# Patient Record
Sex: FEMALE | Race: OTHER | NOT HISPANIC OR LATINO
[De-identification: names, ages, dates, MRNs, and addresses within clinical notes are randomized per-mention and may not be internally consistent; named-entity substitution may affect disease eponyms.]

---

## 2018-05-29 ENCOUNTER — TRANSCRIPTION ENCOUNTER (OUTPATIENT)
Age: 73
End: 2018-05-29

## 2018-10-02 ENCOUNTER — OUTPATIENT (OUTPATIENT)
Dept: OUTPATIENT SERVICES | Facility: HOSPITAL | Age: 73
LOS: 1 days | Discharge: HOME | End: 2018-10-02
Payer: COMMERCIAL

## 2018-10-09 PROCEDURE — 90791 PSYCH DIAGNOSTIC EVALUATION: CPT

## 2018-10-24 PROCEDURE — 90870 ELECTROCONVULSIVE THERAPY: CPT

## 2018-10-24 RX ORDER — MIDAZOLAM HYDROCHLORIDE 1 MG/ML
4 INJECTION, SOLUTION INTRAMUSCULAR; INTRAVENOUS ONCE
Qty: 0 | Refills: 0 | Status: DISCONTINUED | OUTPATIENT
Start: 2018-10-24 | End: 2018-10-24

## 2018-10-24 RX ADMIN — MIDAZOLAM HYDROCHLORIDE 4 MILLIGRAM(S): 1 INJECTION, SOLUTION INTRAMUSCULAR; INTRAVENOUS at 14:45

## 2018-10-25 DIAGNOSIS — F33.2 MAJOR DEPRESSIVE DISORDER, RECURRENT SEVERE WITHOUT PSYCHOTIC FEATURES: ICD-10-CM

## 2018-10-26 PROCEDURE — 90870 ELECTROCONVULSIVE THERAPY: CPT

## 2018-10-26 RX ORDER — MIDAZOLAM HYDROCHLORIDE 1 MG/ML
4 INJECTION, SOLUTION INTRAMUSCULAR; INTRAVENOUS ONCE
Qty: 0 | Refills: 0 | Status: DISCONTINUED | OUTPATIENT
Start: 2018-10-26 | End: 2018-10-26

## 2018-10-26 RX ADMIN — MIDAZOLAM HYDROCHLORIDE 4 MILLIGRAM(S): 1 INJECTION, SOLUTION INTRAMUSCULAR; INTRAVENOUS at 13:15

## 2018-10-31 PROCEDURE — 90870 ELECTROCONVULSIVE THERAPY: CPT

## 2018-10-31 RX ORDER — MIDAZOLAM HYDROCHLORIDE 1 MG/ML
2 INJECTION, SOLUTION INTRAMUSCULAR; INTRAVENOUS ONCE
Qty: 0 | Refills: 0 | Status: DISCONTINUED | OUTPATIENT
Start: 2018-10-31 | End: 2018-10-31

## 2018-10-31 RX ADMIN — MIDAZOLAM HYDROCHLORIDE 2 MILLIGRAM(S): 1 INJECTION, SOLUTION INTRAMUSCULAR; INTRAVENOUS at 08:04

## 2018-11-02 PROCEDURE — 90870 ELECTROCONVULSIVE THERAPY: CPT

## 2018-11-02 RX ORDER — MIDAZOLAM HYDROCHLORIDE 1 MG/ML
2 INJECTION, SOLUTION INTRAMUSCULAR; INTRAVENOUS ONCE
Qty: 0 | Refills: 0 | Status: DISCONTINUED | OUTPATIENT
Start: 2018-11-02 | End: 2018-11-02

## 2018-11-02 RX ADMIN — MIDAZOLAM HYDROCHLORIDE 2 MILLIGRAM(S): 1 INJECTION, SOLUTION INTRAMUSCULAR; INTRAVENOUS at 08:04

## 2018-11-05 PROCEDURE — 90870 ELECTROCONVULSIVE THERAPY: CPT

## 2018-11-05 RX ORDER — MIDAZOLAM HYDROCHLORIDE 1 MG/ML
2 INJECTION, SOLUTION INTRAMUSCULAR; INTRAVENOUS ONCE
Qty: 0 | Refills: 0 | Status: DISCONTINUED | OUTPATIENT
Start: 2018-11-05 | End: 2018-11-05

## 2018-11-05 RX ADMIN — MIDAZOLAM HYDROCHLORIDE 2 MILLIGRAM(S): 1 INJECTION, SOLUTION INTRAMUSCULAR; INTRAVENOUS at 12:35

## 2018-11-09 PROCEDURE — 90870 ELECTROCONVULSIVE THERAPY: CPT

## 2018-11-09 RX ORDER — MIDAZOLAM HYDROCHLORIDE 1 MG/ML
2 INJECTION, SOLUTION INTRAMUSCULAR; INTRAVENOUS ONCE
Qty: 0 | Refills: 0 | Status: DISCONTINUED | OUTPATIENT
Start: 2018-11-09 | End: 2018-11-09

## 2018-11-09 RX ADMIN — MIDAZOLAM HYDROCHLORIDE 2 MILLIGRAM(S): 1 INJECTION, SOLUTION INTRAMUSCULAR; INTRAVENOUS at 12:03

## 2018-11-13 PROCEDURE — 90870 ELECTROCONVULSIVE THERAPY: CPT

## 2018-11-13 RX ORDER — MIDAZOLAM HYDROCHLORIDE 1 MG/ML
2 INJECTION, SOLUTION INTRAMUSCULAR; INTRAVENOUS ONCE
Qty: 0 | Refills: 0 | Status: DISCONTINUED | OUTPATIENT
Start: 2018-11-13 | End: 2018-11-13

## 2018-11-13 RX ADMIN — MIDAZOLAM HYDROCHLORIDE 2 MILLIGRAM(S): 1 INJECTION, SOLUTION INTRAMUSCULAR; INTRAVENOUS at 08:25

## 2018-11-19 PROCEDURE — 90870 ELECTROCONVULSIVE THERAPY: CPT

## 2018-11-19 RX ORDER — MIDAZOLAM HYDROCHLORIDE 1 MG/ML
2 INJECTION, SOLUTION INTRAMUSCULAR; INTRAVENOUS ONCE
Qty: 0 | Refills: 0 | Status: DISCONTINUED | OUTPATIENT
Start: 2018-11-19 | End: 2018-11-19

## 2018-11-19 RX ADMIN — MIDAZOLAM HYDROCHLORIDE 2 MILLIGRAM(S): 1 INJECTION, SOLUTION INTRAMUSCULAR; INTRAVENOUS at 08:21

## 2018-11-26 PROCEDURE — 90870 ELECTROCONVULSIVE THERAPY: CPT

## 2018-11-26 RX ORDER — MIDAZOLAM HYDROCHLORIDE 1 MG/ML
2 INJECTION, SOLUTION INTRAMUSCULAR; INTRAVENOUS ONCE
Qty: 0 | Refills: 0 | Status: DISCONTINUED | OUTPATIENT
Start: 2018-11-26 | End: 2018-11-26

## 2018-11-26 RX ADMIN — MIDAZOLAM HYDROCHLORIDE 2 MILLIGRAM(S): 1 INJECTION, SOLUTION INTRAMUSCULAR; INTRAVENOUS at 08:03

## 2018-12-03 PROCEDURE — 90870 ELECTROCONVULSIVE THERAPY: CPT

## 2018-12-03 RX ORDER — MIDAZOLAM HYDROCHLORIDE 1 MG/ML
2 INJECTION, SOLUTION INTRAMUSCULAR; INTRAVENOUS ONCE
Qty: 0 | Refills: 0 | Status: DISCONTINUED | OUTPATIENT
Start: 2018-12-03 | End: 2018-12-03

## 2018-12-03 RX ADMIN — MIDAZOLAM HYDROCHLORIDE 2 MILLIGRAM(S): 1 INJECTION, SOLUTION INTRAMUSCULAR; INTRAVENOUS at 08:00

## 2018-12-18 PROCEDURE — 90870 ELECTROCONVULSIVE THERAPY: CPT

## 2018-12-18 RX ORDER — MIDAZOLAM HYDROCHLORIDE 1 MG/ML
2 INJECTION, SOLUTION INTRAMUSCULAR; INTRAVENOUS ONCE
Qty: 0 | Refills: 0 | Status: DISCONTINUED | OUTPATIENT
Start: 2018-12-18 | End: 2018-12-18

## 2018-12-18 RX ADMIN — MIDAZOLAM HYDROCHLORIDE 2 MILLIGRAM(S): 1 INJECTION, SOLUTION INTRAMUSCULAR; INTRAVENOUS at 09:54

## 2019-01-28 PROCEDURE — 90870 ELECTROCONVULSIVE THERAPY: CPT

## 2019-01-28 RX ORDER — MIDAZOLAM HYDROCHLORIDE 1 MG/ML
2 INJECTION, SOLUTION INTRAMUSCULAR; INTRAVENOUS ONCE
Qty: 0 | Refills: 0 | Status: DISCONTINUED | OUTPATIENT
Start: 2019-01-28 | End: 2019-01-28

## 2019-01-28 RX ADMIN — MIDAZOLAM HYDROCHLORIDE 2 MILLIGRAM(S): 1 INJECTION, SOLUTION INTRAMUSCULAR; INTRAVENOUS at 08:55

## 2019-02-19 PROCEDURE — 90870 ELECTROCONVULSIVE THERAPY: CPT

## 2019-02-19 RX ORDER — MIDAZOLAM HYDROCHLORIDE 1 MG/ML
2 INJECTION, SOLUTION INTRAMUSCULAR; INTRAVENOUS ONCE
Qty: 0 | Refills: 0 | Status: DISCONTINUED | OUTPATIENT
Start: 2019-02-19 | End: 2019-02-19

## 2019-02-19 RX ADMIN — MIDAZOLAM HYDROCHLORIDE 2 MILLIGRAM(S): 1 INJECTION, SOLUTION INTRAMUSCULAR; INTRAVENOUS at 10:12

## 2019-02-25 PROCEDURE — 90870 ELECTROCONVULSIVE THERAPY: CPT

## 2019-02-25 RX ORDER — MIDAZOLAM HYDROCHLORIDE 1 MG/ML
2 INJECTION, SOLUTION INTRAMUSCULAR; INTRAVENOUS ONCE
Qty: 0 | Refills: 0 | Status: DISCONTINUED | OUTPATIENT
Start: 2019-02-25 | End: 2019-02-25

## 2019-02-25 RX ADMIN — MIDAZOLAM HYDROCHLORIDE 2 MILLIGRAM(S): 1 INJECTION, SOLUTION INTRAMUSCULAR; INTRAVENOUS at 09:24

## 2019-03-06 PROCEDURE — 90870 ELECTROCONVULSIVE THERAPY: CPT

## 2019-03-06 RX ORDER — MIDAZOLAM HYDROCHLORIDE 1 MG/ML
2 INJECTION, SOLUTION INTRAMUSCULAR; INTRAVENOUS ONCE
Qty: 0 | Refills: 0 | Status: DISCONTINUED | OUTPATIENT
Start: 2019-03-06 | End: 2019-03-06

## 2019-03-06 RX ADMIN — MIDAZOLAM HYDROCHLORIDE 2 MILLIGRAM(S): 1 INJECTION, SOLUTION INTRAMUSCULAR; INTRAVENOUS at 16:20

## 2019-03-12 RX ORDER — MIDAZOLAM HYDROCHLORIDE 1 MG/ML
2 INJECTION, SOLUTION INTRAMUSCULAR; INTRAVENOUS ONCE
Qty: 0 | Refills: 0 | Status: DISCONTINUED | OUTPATIENT
Start: 2019-03-12 | End: 2019-03-12

## 2019-03-12 RX ADMIN — MIDAZOLAM HYDROCHLORIDE 2 MILLIGRAM(S): 1 INJECTION, SOLUTION INTRAMUSCULAR; INTRAVENOUS at 09:15

## 2019-03-25 PROCEDURE — 90870 ELECTROCONVULSIVE THERAPY: CPT

## 2019-03-25 RX ORDER — MIDAZOLAM HYDROCHLORIDE 1 MG/ML
2 INJECTION, SOLUTION INTRAMUSCULAR; INTRAVENOUS ONCE
Qty: 0 | Refills: 0 | Status: DISCONTINUED | OUTPATIENT
Start: 2019-03-25 | End: 2019-03-25

## 2019-03-25 RX ADMIN — MIDAZOLAM HYDROCHLORIDE 2 MILLIGRAM(S): 1 INJECTION, SOLUTION INTRAMUSCULAR; INTRAVENOUS at 07:59

## 2019-04-08 PROCEDURE — 90870 ELECTROCONVULSIVE THERAPY: CPT

## 2019-04-08 RX ORDER — MIDAZOLAM HYDROCHLORIDE 1 MG/ML
2 INJECTION, SOLUTION INTRAMUSCULAR; INTRAVENOUS ONCE
Qty: 0 | Refills: 0 | Status: DISCONTINUED | OUTPATIENT
Start: 2019-04-08 | End: 2019-04-08

## 2019-04-08 RX ADMIN — MIDAZOLAM HYDROCHLORIDE 2 MILLIGRAM(S): 1 INJECTION, SOLUTION INTRAMUSCULAR; INTRAVENOUS at 08:02

## 2019-04-29 PROCEDURE — 90870 ELECTROCONVULSIVE THERAPY: CPT

## 2019-04-29 RX ORDER — MIDAZOLAM HYDROCHLORIDE 1 MG/ML
2 INJECTION, SOLUTION INTRAMUSCULAR; INTRAVENOUS ONCE
Qty: 0 | Refills: 0 | Status: DISCONTINUED | OUTPATIENT
Start: 2019-04-29 | End: 2019-04-29

## 2019-04-29 RX ADMIN — MIDAZOLAM HYDROCHLORIDE 2 MILLIGRAM(S): 1 INJECTION, SOLUTION INTRAMUSCULAR; INTRAVENOUS at 07:46

## 2019-05-06 ENCOUNTER — APPOINTMENT (OUTPATIENT)
Dept: NEUROLOGY | Facility: CLINIC | Age: 74
End: 2019-05-06

## 2019-05-21 PROCEDURE — 90870 ELECTROCONVULSIVE THERAPY: CPT

## 2019-05-21 RX ORDER — MIDAZOLAM HYDROCHLORIDE 1 MG/ML
2 INJECTION, SOLUTION INTRAMUSCULAR; INTRAVENOUS ONCE
Refills: 0 | Status: DISCONTINUED | OUTPATIENT
Start: 2019-05-21 | End: 2019-05-21

## 2019-05-21 RX ADMIN — MIDAZOLAM HYDROCHLORIDE 2 MILLIGRAM(S): 1 INJECTION, SOLUTION INTRAMUSCULAR; INTRAVENOUS at 08:32

## 2019-06-17 PROCEDURE — 90870 ELECTROCONVULSIVE THERAPY: CPT

## 2019-06-17 RX ORDER — MIDAZOLAM HYDROCHLORIDE 1 MG/ML
2 INJECTION, SOLUTION INTRAMUSCULAR; INTRAVENOUS ONCE
Refills: 0 | Status: DISCONTINUED | OUTPATIENT
Start: 2019-06-17 | End: 2019-06-17

## 2019-06-17 RX ADMIN — MIDAZOLAM HYDROCHLORIDE 2 MILLIGRAM(S): 1 INJECTION, SOLUTION INTRAMUSCULAR; INTRAVENOUS at 08:37

## 2021-05-11 ENCOUNTER — APPOINTMENT (OUTPATIENT)
Dept: DISASTER EMERGENCY | Facility: OTHER | Age: 76
End: 2021-05-11
Payer: MEDICARE

## 2021-05-11 PROCEDURE — 0012A: CPT

## 2023-12-30 ENCOUNTER — EMERGENCY (EMERGENCY)
Facility: HOSPITAL | Age: 78
LOS: 0 days | Discharge: HOME | End: 2023-12-30
Attending: STUDENT IN AN ORGANIZED HEALTH CARE EDUCATION/TRAINING PROGRAM
Payer: MEDICARE

## 2023-12-30 VITALS
HEART RATE: 76 BPM | DIASTOLIC BLOOD PRESSURE: 79 MMHG | HEIGHT: 60 IN | TEMPERATURE: 98 F | SYSTOLIC BLOOD PRESSURE: 156 MMHG | OXYGEN SATURATION: 94 % | WEIGHT: 130.07 LBS | RESPIRATION RATE: 20 BRPM

## 2023-12-30 VITALS
SYSTOLIC BLOOD PRESSURE: 148 MMHG | TEMPERATURE: 99 F | DIASTOLIC BLOOD PRESSURE: 77 MMHG | OXYGEN SATURATION: 97 % | HEART RATE: 92 BPM | RESPIRATION RATE: 18 BRPM

## 2023-12-30 DIAGNOSIS — F41.9 ANXIETY DISORDER, UNSPECIFIED: ICD-10-CM

## 2023-12-30 DIAGNOSIS — F03.90 UNSPECIFIED DEMENTIA WITHOUT BEHAVIORAL DISTURBANCE: ICD-10-CM

## 2023-12-30 DIAGNOSIS — R09.89 OTHER SPECIFIED SYMPTOMS AND SIGNS INVOLVING THE CIRCULATORY AND RESPIRATORY SYSTEMS: ICD-10-CM

## 2023-12-30 DIAGNOSIS — I10 ESSENTIAL (PRIMARY) HYPERTENSION: ICD-10-CM

## 2023-12-30 PROCEDURE — 71046 X-RAY EXAM CHEST 2 VIEWS: CPT | Mod: 26

## 2023-12-30 PROCEDURE — 99284 EMERGENCY DEPT VISIT MOD MDM: CPT

## 2023-12-30 NOTE — ED ADULT NURSE NOTE - OBJECTIVE STATEMENT
as per granddaughter, pt increased lethargy and back pain x 2 days, pt noticed pt choking, lips turning breathing, ems called, patient improved prior to ems arrival. patient appear comfortable. denies respiratory distress, denies throat pain. pt c/o back pain. pt also fell out of chair today. patient energetic at baseline.  Darci # [id number]. as per granddaughter, pt increased lethargy and back pain x 2 days, pt noticed pt choking, lips turning breathing, ems called, patient improved prior to ems arrival. patient appear comfortable. denies respiratory distress, denies throat pain. pt c/o back pain. pt also fell out of chair today. patient energetic at baseline.  Darci # [id number].

## 2023-12-30 NOTE — ED PROVIDER NOTE - NSFOLLOWUPINSTRUCTIONS_ED_ALL_ED_FT
followup with primary care doctor in next 7 days.    return if symptoms worsen, difficulty swallowing, throat pain, chest pain, shortness of breath.

## 2023-12-30 NOTE — ED PROVIDER NOTE - OBJECTIVE STATEMENT
79 y/o F  hx of dementia, anxiety, HTN presents for choking episode. daughter at bedside, states that granddaughter was home w/ patient when she had a few second episode where she was choking on salmon/rice and turned "blue" but quickly returned back to pink color. patient currently not endorsing any acute complaints, denies any throat pain/chest pain. Denies fever/chills. Denies nausea/vomiting. denies abdominal pain.

## 2023-12-30 NOTE — ED ADULT NURSE NOTE - NSFALLRISKINTERV_ED_ALL_ED
Assistance OOB with selected safe patient handling equipment if applicable/Assistance with ambulation/Communicate fall risk and risk factors to all staff, patient, and family/Monitor gait and stability/Monitor for mental status changes and reorient to person, place, and time, as needed/Provide visual cue: yellow wristband, yellow gown, etc/Reinforce activity limits and safety measures with patient and family/Toileting schedule using arm’s reach rule for commode and bathroom/Use of alarms - bed, stretcher, chair and/or video monitoring/Call bell, personal items and telephone in reach/Instruct patient to call for assistance before getting out of bed/chair/stretcher/Non-slip footwear applied when patient is off stretcher/[place] to call system/Physically safe environment - no spills, clutter or unnecessary equipment/Purposeful Proactive Rounding/Room/bathroom lighting operational, light cord in reach Assistance OOB with selected safe patient handling equipment if applicable/Assistance with ambulation/Communicate fall risk and risk factors to all staff, patient, and family/Monitor gait and stability/Monitor for mental status changes and reorient to person, place, and time, as needed/Provide visual cue: yellow wristband, yellow gown, etc/Reinforce activity limits and safety measures with patient and family/Toileting schedule using arm’s reach rule for commode and bathroom/Use of alarms - bed, stretcher, chair and/or video monitoring/Call bell, personal items and telephone in reach/Instruct patient to call for assistance before getting out of bed/chair/stretcher/Non-slip footwear applied when patient is off stretcher/[place] to call system/Physically safe environment - no spills, clutter or unnecessary equipment/Purposeful Proactive Rounding/Room/bathroom lighting operational, light cord in reach

## 2023-12-30 NOTE — ED PROVIDER NOTE - CLINICAL SUMMARY MEDICAL DECISION MAKING FREE TEXT BOX
77 y/o F  hx of dementia, anxiety, HTN presents for choking episode. daughter at bedside, states that granddaughter was home w/ patient when she had a few second episode where she was choking on salmon/rice and turned "blue" but quickly returned back to pink color. patient currently not endorsing any acute complaints, denies any throat pain/chest pain. Denies fever/chills. Denies nausea/vomiting. denies abdominal pain.   tolerated Po intake here in ER w/o any dysphagia or discomfort.   at baseline now w/ no acute complaints. cxr and will reassess. 79 y/o F  hx of dementia, anxiety, HTN presents for choking episode. daughter at bedside, states that granddaughter was home w/ patient when she had a few second episode where she was choking on salmon/rice and turned "blue" but quickly returned back to pink color. patient currently not endorsing any acute complaints, denies any throat pain/chest pain. Denies fever/chills. Denies nausea/vomiting. denies abdominal pain.   tolerated Po intake here in ER w/o any dysphagia or discomfort.   at baseline now w/ no acute complaints. cxr and will reassess.

## 2023-12-30 NOTE — ED PROVIDER NOTE - PROGRESS NOTE DETAILS
no foreign body appreciated on cxr, patient comfortable w/ no acute complaints, tolerated Po intake here.

## 2023-12-30 NOTE — ED ADULT NURSE NOTE - CHIEF COMPLAINT QUOTE
Choking on a piece of salmon turned blue H/O Dementia HTN Anxiety Romansh speaking granddaughter translating and witnessed episode at home Ina Olson pt pink unlaboured at triage Choking on a piece of salmon turned blue H/O Dementia HTN Anxiety Malay speaking granddaughter translating and witnessed episode at home Ina Olson pt pink unlaboured at triage

## 2023-12-30 NOTE — ED PROVIDER NOTE - NSICDXPASTMEDICALHX_GEN_ALL_CORE_FT
PAST MEDICAL HISTORY:  Depression     HLD (hyperlipidemia)     HTN (hypertension)      Colchicine Counseling:  Patient counseled regarding adverse effects including but not limited to stomach upset (nausea, vomiting, stomach pain, or diarrhea).  Patient instructed to limit alcohol consumption while taking this medication.  Colchicine may reduce blood counts especially with prolonged use.  The patient understands that monitoring of kidney function and blood counts may be required, especially at baseline. The patient verbalized understanding of the proper use and possible adverse effects of colchicine.  All of the patient's questions and concerns were addressed.

## 2023-12-30 NOTE — ED ADULT TRIAGE NOTE - CHIEF COMPLAINT QUOTE
Choking on a piece of salmon turned blue H/O Dementia HTN Anxiety Mohawk speaking granddaughter translating and witnessed episode at home Ina Olson pt pink unlaboured at triage Choking on a piece of salmon turned blue H/O Dementia HTN Anxiety Turkish speaking granddaughter translating and witnessed episode at home Ina Olson pt pink unlaboured at triage

## 2023-12-30 NOTE — ED PROVIDER NOTE - PATIENT PORTAL LINK FT
You can access the FollowMyHealth Patient Portal offered by [facility] by registering at the following website: [email or web address]. By joining [facility]’s FollowMyHealth portal, you will also be able to view your health information using other applications (apps) compatible with our system. You can access the FollowMyHealth Patient Portal offered by [facility] by registering at the following website: [email or web address]. By joining [facility]’s FollowMyHealth portal, you will also be able to view your health information using other applications (apps) compatible with our system.

## 2024-01-10 ENCOUNTER — APPOINTMENT (OUTPATIENT)
Dept: ORTHOPEDIC SURGERY | Facility: CLINIC | Age: 79
End: 2024-01-10
Payer: MEDICARE

## 2024-01-10 VITALS
HEART RATE: 105 BPM | BODY MASS INDEX: 31.27 KG/M2 | DIASTOLIC BLOOD PRESSURE: 89 MMHG | WEIGHT: 139 LBS | HEIGHT: 56 IN | SYSTOLIC BLOOD PRESSURE: 149 MMHG

## 2024-01-10 DIAGNOSIS — S22.009A UNSPECIFIED FRACTURE OF UNSPECIFIED THORACIC VERTEBRA, INITIAL ENCOUNTER FOR CLOSED FRACTURE: ICD-10-CM

## 2024-01-10 PROCEDURE — 99203 OFFICE O/P NEW LOW 30 MIN: CPT

## 2024-01-10 NOTE — HISTORY OF PRESENT ILLNESS
[de-identified] : Ms. DARCIE GRANT  is a 78 year old female who presents with complaints of thoracic back pain since [place] without any specific cause or trauma.  Denies any LE radicular symptoms.  Normal bowel and bladder control.   Denies any recent fevers, chills, sweats, weight loss, or infection.  She has been taking Tylenol and advil and over the past few days her pain is much better.  She is here to review her xrays.   The patients past medical history, past surgical history, medications, allergies, and social history were reviewed by me today with the patient and documented accordingly.  In addition, the patient's family history, which is noncontributory to their visit, was also reviewed.

## 2024-01-10 NOTE — PHYSICAL EXAM
[de-identified] : Examination of the thoracic and lumbar spine reveals no midline tenderness palpation, step-offs, or skin lesions. Decreased range of motion with respect to flexion, extension, lateral bending, and rotation. No tenderness to palpation of the sciatic notch. No tenderness palpation of the bilateral greater trochanters. No pain with passive internal/external rotation of the hips. No instability of bilateral lower extremities.  Negative NEMESIO. Negative straight leg raise bilaterally. No bowstring. Negative femoral stretch. 5 out of 5 iliopsoas, hip abductors, hips adductors, quadriceps, hamstrings, gastrocsoleus, tibialis anterior, extensor hallucis longus, peroneals. Grossly intact sensation to light touch bilateral lower extremities. 1+ patellar and Achilles reflexes. Downgoing Babinski. No clonus. Intact proprioception. Palpable pulses. No skin lesion and no edema on the right and left lower extremities. [de-identified] : Review of her imaging reveals some age-indeterminate thoracic compression deformities

## 2024-01-10 NOTE — DISCUSSION/SUMMARY
[de-identified] : Given the evidence of fracture on imaging I recommend a thoracic MRI.  Follow-up afterwards.

## 2024-01-29 ENCOUNTER — OUTPATIENT (OUTPATIENT)
Dept: OUTPATIENT SERVICES | Facility: HOSPITAL | Age: 79
LOS: 1 days | Discharge: HOME | End: 2024-01-29
Payer: MEDICARE

## 2024-02-05 PROCEDURE — 90792 PSYCH DIAG EVAL W/MED SRVCS: CPT

## 2024-02-05 NOTE — ECT CONSULT NOTE - NSECTREASONCONSCOMMENTS_PSY_ALL_CORE
Pt interviewed via Teams Solo program with 2 daughters in room per her request.   MD was in private home office in [place]

## 2024-02-05 NOTE — ECT CONSULT NOTE - DESCRIPTION
HTN, HLD, osteoporosis (recently dx with spinal compression fracture ?location and per daughters is not supposed to lift heavy objects)  NKDA  Dentita: Good

## 2024-02-05 NOTE — ECT CONSULT NOTE - OTHER PAST PSYCHIATRIC HISTORY (INCLUDE DETAILS REGARDING ONSET, COURSE OF ILLNESS, INPATIENT/OUTPATIENT TREATMENT)
77 yo Kenyan speaking female (daughters available to translate per pts request), domiciled in [place].  H/o MDD tx with ECT x2.   Last ECT was 6/19 (Bifrontal #20, tapered to monthly maintenance schedule).   Pt is poor historian saying she is anxious and asks daughters to help with info.   Of note, pt was not oriented to date but was to person, location and situation.    Per daughters, pt was doing well after last course of ECT up to around 1 month ago.   Started having back pain and was diagnosed with COMPRESSION FRACTURE OF SPINE.   Since then pt has been in mild pain, less active.  This progressed to lower motivation to be out, not wanting to go to social events at "[employer]" (which she usually enjoys).  Daughters note she is eating less (did not have breakfast or lunch today).  Less concentration, inc anxiety.  No SI.    Last course of ECT was all ambulatory, staring 10/24/18-6/17/24.  Daughters and pt states she did well with ECT.   Depression resolved and she has been symptom free till about 1 month ago.   No report of significant side effects from last course.      Was admitted to [facility] July-Aug 2016 for similar depression with anxiety. Was treated with medications and ECT (had 7 Bifrontal ECTs, last 8/8/16 at 40% energy) with quick resolution of depression. Patient does not remember being hospitalized or ECT. Daughters support there was some short-term memory issues around her treatment (no problems with retaining information now). This was patient's first episode of depression (which started about 2 yrs prior). Since patient was doing well after hospitalization, patient requested to stop ECT and meds. Was doing well until recently. No other hospitalizations. No history of dangerousness.    Has been in tx outpatient f/u with Dr. Sellers and compliant with medications.

## 2024-02-05 NOTE — ECT CONSULT NOTE - NSECTRECELECPLCOTHER_PSY_ALL_CORE
See comment above.  BF if cog stable as pt did well with this in 2018.  If cog poor, consider RUL tx approach.

## 2024-02-05 NOTE — ECT CONSULT NOTE - NSECTASSESSRECOMM_PSY_ALL_CORE
A course of ECT is indicated for recurrent severe depression.  Pt has new medical complication of spinal compression fraction.   Family aware of risk to spine during ECT and that we NEED SPINE MD TO COMMENT ON STABILITY of spine to undergo repeated procedures.        Pt was very anxious today but scored lower than 5 yrs ago on MMSE.   Possibly related to anxiety and difficulty with interview over daughter's iPhone.   Will rec BF ECT as pt did well with this before but should be reassessed prior to 1st tx and if cog score is low, consider RUL tx.    The patient encounter was from 1:00P to 2:30P      More than 50% of the time was spent in counselling and/or coordination of care, including but not limited to discussing with patient and family risk and benefits of ECT, the usual trajectory of response with acute course of ECT, obtaining informed consent for ECT, reviewing ECT fasting guidelines, reviewing the need for periodic history and physical and labwork. Patient was asked to obtain labwork (CBC, BMP), EKG and medical clearance.  Referring psychiatrist was contacted.

## 2024-02-05 NOTE — ECT CONSULT NOTE - NSMMSELANG_PSY_ALL_CORE
Named object #1 (e.g.Pen)/Named object #2 (e.g.Watch)/Repeated "no ifs, ands or buts"/Completed command (Stage #1)/Completed command (Stage #2)

## 2024-02-06 ENCOUNTER — NON-APPOINTMENT (OUTPATIENT)
Age: 79
End: 2024-02-06

## 2024-02-09 VITALS
DIASTOLIC BLOOD PRESSURE: 79 MMHG | HEART RATE: 97 BPM | SYSTOLIC BLOOD PRESSURE: 147 MMHG | TEMPERATURE: 97 F | OXYGEN SATURATION: 95 % | RESPIRATION RATE: 16 BRPM

## 2024-02-09 PROCEDURE — 90870 ELECTROCONVULSIVE THERAPY: CPT

## 2024-02-09 RX ORDER — MIDAZOLAM HYDROCHLORIDE 5 MG/ML
1 INJECTION, SOLUTION INTRAMUSCULAR; INTRAVENOUS ONCE
Refills: 0 | Status: DISCONTINUED | OUTPATIENT
Start: 2024-02-09 | End: 2024-02-09

## 2024-02-09 NOTE — ECT TREATMENT NOTE - NSECTCOMMENTS_PSY_ALL_CORE
Patient presents for first treatment.  Spoke to patient with ,  ID: [id number].  Patient feels depressed.  Cognition score is 4/10.  We discuss risks and benefits of ECT in general and of specific lead placements.  Patient defers decision about lead placement to me, and I recommend RUL placement in view of pre-existing cognitive deficits.  Spoke with daughter who asks about treatment schedule.  We will continue with acute course.

## 2024-02-12 DIAGNOSIS — F33.2 MAJOR DEPRESSIVE DISORDER, RECURRENT SEVERE WITHOUT PSYCHOTIC FEATURES: ICD-10-CM

## 2024-02-14 PROCEDURE — 90870 ELECTROCONVULSIVE THERAPY: CPT

## 2024-02-14 NOTE — ECT TREATMENT NOTE - NSECTCOMMENTS_PSY_ALL_CORE
Pt states that she has been anxious, denies depression, denies SI. No oriented to time, does not remember her address. NO clear SEs from 1st session.

## 2024-02-16 PROCEDURE — 90870 ELECTROCONVULSIVE THERAPY: CPT

## 2024-02-16 NOTE — ECT TREATMENT NOTE - NSECTCOMMENTS_PSY_ALL_CORE
Patient pleasant and cooperative on approach. She is quite confused at this time, but wants to continue treatments. The daughter reports improvement in mood and functioning. Consider increasing the stimulus dose next time.

## 2024-02-21 PROCEDURE — 90870 ELECTROCONVULSIVE THERAPY: CPT

## 2024-02-21 NOTE — ECT TREATMENT NOTE - NSECTCOMMENTS_PSY_ALL_CORE
Patient pleasant and cooperative on approach. States that she has been nervous, sad, denies insomnia. Will cont acute for now

## 2024-02-23 PROCEDURE — 90870 ELECTROCONVULSIVE THERAPY: CPT

## 2024-02-23 NOTE — ECT TREATMENT NOTE - NSECTCOMMENTS_PSY_ALL_CORE
Patient calm and cooperative on approach. No change in symptoms at this time. Will continue twice a week treatments at this time.

## 2024-02-26 PROCEDURE — 90870 ELECTROCONVULSIVE THERAPY: CPT

## 2024-02-26 NOTE — ECT TREATMENT NOTE - NSECTCOMMENTS_PSY_ALL_CORE
Patient was interviewed with  # [id number]. She reports feeling better. Needs medications to fall asleep. Appetite; improved. Energy: improved. Denies any crying spells. She reports that her motivation to do things has improved, but denies consistent motivation. Denies death wish/suicidal ideation/intent/plan. Affect: improved. Was smiling during interactions. Family reported patient to be "minimally improved" on the  sign-in sheet.

## 2024-02-28 PROCEDURE — 90870 ELECTROCONVULSIVE THERAPY: CPT

## 2024-02-28 NOTE — ECT TREATMENT NOTE - NSECTCOMMENTS_PSY_ALL_CORE
Pt states that she think that she is "the same", no clear changes. Still presenting with significant cognitive impairment. Will cont acute management.

## 2024-03-04 PROCEDURE — 90870 ELECTROCONVULSIVE THERAPY: CPT

## 2024-03-04 NOTE — ECT TREATMENT NOTE - NSECTCOMMENTS_PSY_ALL_CORE
Patient calm and cooperative on approach. She reports feeling well, no interval complaints, better sleep and appetite. Will continue current treatment schedule.

## 2024-03-07 PROCEDURE — 90870 ELECTROCONVULSIVE THERAPY: CPT

## 2024-03-07 NOTE — ECT TREATMENT NOTE - NSECTCOMMENTS_PSY_ALL_CORE
Patient calm and cooperative on approach. She reports feeling slightly better in mood, no interval complaints, better sleep and appetite. Reports her memory is fluctuating. Will continue current treatment schedule.

## 2024-03-11 PROCEDURE — 90870 ELECTROCONVULSIVE THERAPY: CPT

## 2024-03-11 NOTE — ECT TREATMENT NOTE - NSECTCOMMENTS_PSY_ALL_CORE
Talked to patient in Slovenian. She reports that she's been doing a bit better and said that ECT has been helpful, but still reports moments in which she's not feeling well. Denies any suicidal thoughts or plan. Reports good sleep and appetite. Said that she goes to the community center twice a week to socialize with some friends.

## 2024-03-14 PROCEDURE — 90870 ELECTROCONVULSIVE THERAPY: CPT

## 2024-03-14 NOTE — ECT TREATMENT NOTE - NSECTCOMMENTS_PSY_ALL_CORE
Seen with  #[id number]. Patient reported overall doing well, still with "a little bit" of depression; cognitive score is improving to 5/10, patient notes occasional cognitive lapses without significant interference. Encouraged patient to follow up with her psychiatrist as soon as possible; return early next week to determine if a second treatment next week is warranted.

## 2024-03-18 PROCEDURE — 90870 ELECTROCONVULSIVE THERAPY: CPT

## 2024-03-18 NOTE — ECT TREATMENT NOTE - NSECTCOMMENTS_PSY_ALL_CORE
Patient seen with  Vijaya #[id number]. Patient is pleasant and cooperative on approach. She reports doing well, with good mood, good sleep and appetite. No interval complaints. Patient appears to be in remission. Will change to weekly maintenance treatments at this time.

## 2024-03-18 NOTE — ECT AMBULATORY DISCHARGE PLAN - PATIENT/CAREGIVER ACCEPTED INTERPRETER SERVICES
URGENT CARE NOTE    Chief Complaint   Patient presents with   • Shoulder Pain     Rt shoulder pain - has been having some shoulder pain for a bit but yesterday was throughing the ball for his dog and his dog took him out and he landed on his shoulder, pain is now going, up into his neck        HPI: Inocencio Francis is a 30 year old male with history of right shoulder bursitis who presents to Urgent Care today with increased right shoulder pain after falling onto the shoulder with outstretched arm yesterday evening. He describes dull and intermittently sharp pain in the right shoulder radiating to the neck and across upper back, worsened with movement. Patient typically takes OTC aleve daily for his chronic shoulder pain with relief, however, he was unable to sleep last night due to pain, which was unchanged with aleve, tylenol PM, and ice. He denies any numbness/tingling in the extremity, and no right wrist or hand pain following fall.      The following patient allergies, current medications, and past medical history were reviewed as below.  Current Outpatient Medications   Medication Sig Dispense Refill   • naproxen sodium (ALEVE) 220 MG tablet Take 220 mg by mouth 2 times daily (with meals).     • diphenhydrAMINE-APAP, sleep, (TYLENOL PM EXTRA STRENGTH PO)      • meloxicam (MOBIC) 7.5 MG tablet Take 1 tablet by mouth daily as needed for Pain. 9 tablet 0   • cyclobenzaprine (FLEXERIL) 10 MG tablet Take 1 tablet by mouth 3 times daily as needed for Muscle spasms. Do not take prior to driving or operating machinery. 9 tablet 0     No current facility-administered medications for this visit.      ALLERGIES:   Allergen Reactions   • Dexedrine Other (See Comments)     Induced coma     • Lettuce    (Food) ANAPHYLAXIS     Past Medical History:   Diagnosis Date   • ADHD (attention deficit hyperactivity disorder) y-11   • Allergy    • Bipolar 1 disorder (CMS/[facility]) 1993    • Depressive disorder    • Mental disorder        ROS:   ALL 13 SYSTEMS REVIEWED AND ARE NEGATIVE  UNLESS OTHERWISE NOTED IN HPI    PHYSICAL EXAMINATION:  Visit Vitals  /82   Pulse 84   Temp 98.2 °F (36.8 °C) (Temporal)   Resp 16   Ht 5' 9\" (1.753 m)   Wt 88.1 kg (194 lb 3.6 oz)   SpO2 99%   BMI 28.68 kg/m²       Constitutional:  Well developed, well nourished, no acute distress, non-toxic appearance   Musculoskeletal: No deformities. Tenderness to palpation over distal right sternocleidomastoid, lateral scapulae, supraspinatus, rhomboids, and superior deltoid. Active shoulder flexion to 90 degrees with pain. Positive right painful arc test, Conn-Polo, and Neer tests. Negative [place] Lift-off.  Integument:  Well hydrated, no rash    Neurologic:  Alert & oriented x 3, CN 2-12 intact. Intact function without focal deficits noted. Motor function limited as above.      Labs  No results found for this visit on 11/22/21.    Radiology  No results found for any visits on 11/22/21 (from the past 48 hour(s)).    No images are attached to the encounter.        ASSESSMENT & PLAN:  Chronic right shoulder pain exacerbated by recent fall, pain limiting activity and sleep. NSAID and muscle relaxer prescribed as below; patient advised not to take muscle relaxer while driving or operating machinery, and meloxicam not to be combined with other NSAIDs. He may also take tylenol as needed for additional pain control, gentle stretches/ROM, continued heat/ice, and rest. Referral also placed to Ortho for further evaluation of patient's chronic right shoulder pain once acute symptoms have improved. All patient questions were answered and work note provided.    Diagnosis:  1. Injury of right shoulder, initial encounter  - meloxicam (MOBIC) 7.5 MG tablet; Take 1 tablet by mouth daily as needed for Pain.  Dispense: 9 tablet; Refill: 0  - cyclobenzaprine (FLEXERIL) 10 MG tablet; Take 1 tablet by mouth 3 times daily as needed for  Muscle spasms. Do not take prior to driving or operating machinery.  Dispense: 9 tablet; Refill: 0    2. Chronic right shoulder pain  - meloxicam (MOBIC) 7.5 MG tablet; Take 1 tablet by mouth daily as needed for Pain.  Dispense: 9 tablet; Refill: 0  - cyclobenzaprine (FLEXERIL) 10 MG tablet; Take 1 tablet by mouth 3 times daily as needed for Muscle spasms. Do not take prior to driving or operating machinery.  Dispense: 9 tablet; Refill: 0  - SERVICE TO ORTHOPEDICS        Follow Up:  No follow-ups on file.   Patient was instructed to return to the ED/UC immediately if symptoms worsen or any new unusual symptoms arise.      Recheck on patient. Discussed with patient UC findings and plan for discharge. Patient was given UC warnings, discharge instructions, and follow up information to go home with. Patient understands and agrees with plan for discharge. Any questions have been answered.      Closure:  The patient understands that this is a provisional diagnosis. Provisional diagnosis can and do change. The diagnosis that you are discharged with today is based on the symptoms with which you presented today. If any new symptoms occur or worsen, you should seek immediate attention for re-evaluation.  Any symptoms that persist or fail to completely resolve require further evaluation by your other healthcare provider(s).    This chart was composed by Gabi Thompson, SETH, BS, APRN, FNP-BC; collaborating physician Dr. Eric Mcgraw MD.      11/22/2021, 9:07 AM.     yes

## 2024-03-26 PROCEDURE — 90870 ELECTROCONVULSIVE THERAPY: CPT

## 2024-03-26 NOTE — ECT TREATMENT NOTE - NSECTCOMMENTS_PSY_ALL_CORE
1st weekly treatment. Evaluated with  Rafael ID #[id number]. Patient is cheerful, cooperative. Patient reports feeling "content" and happy over the past week as her great grandson was born recently. She reports feeling a mixture of anxious and happy today but overall reports feeling stable over the past week without recurrence of depressive symptoms. Sleep fair. Appetite good. Energy good. Daughter reports she is mostly improved. She denies any concerns for the patient and asked about the usual course of treatments. She reports patient has improved significantly since start of treatments and agreed to monitor patient and if symptoms recur, will call for sooner appointment. Will continue with weekly maintenance treatments for now.  1st weekly treatment. Evaluated with  Rafael ID #[id number]. Patient is cheerful, cooperative. Patient reports feeling "content" and happy over the past week as her great grandson was born recently. She reports feeling a mixture of anxious and happy today but overall reports feeling stable over the past week without recurrence of depressive symptoms. Sleep fair. Appetite good. Energy good. Daughter reports she is mostly improved. She denies any concerns for the patient and asked about the usual course of treatments. She reports patient has improved significantly since start of treatments and agreed to monitor patient and if symptoms recur, will call for sooner appointment. Will continue with weekly maintenance treatments for now. Maintenance consent obtained.

## 2024-04-01 ENCOUNTER — EMERGENCY (EMERGENCY)
Facility: HOSPITAL | Age: 79
LOS: 0 days | Discharge: HOME | End: 2024-04-02
Attending: EMERGENCY MEDICINE | Admitting: INTERNAL MEDICINE
Payer: MEDICARE

## 2024-04-01 VITALS
HEART RATE: 120 BPM | OXYGEN SATURATION: 96 % | DIASTOLIC BLOOD PRESSURE: 86 MMHG | SYSTOLIC BLOOD PRESSURE: 151 MMHG | HEIGHT: 56 IN | RESPIRATION RATE: 18 BRPM | TEMPERATURE: 99 F

## 2024-04-01 DIAGNOSIS — N83.201 UNSPECIFIED OVARIAN CYST, RIGHT SIDE: ICD-10-CM

## 2024-04-01 DIAGNOSIS — N39.0 URINARY TRACT INFECTION, SITE NOT SPECIFIED: ICD-10-CM

## 2024-04-01 DIAGNOSIS — I10 ESSENTIAL (PRIMARY) HYPERTENSION: ICD-10-CM

## 2024-04-01 DIAGNOSIS — F32.A DEPRESSION, UNSPECIFIED: ICD-10-CM

## 2024-04-01 DIAGNOSIS — E78.5 HYPERLIPIDEMIA, UNSPECIFIED: ICD-10-CM

## 2024-04-01 DIAGNOSIS — R11.0 NAUSEA: ICD-10-CM

## 2024-04-01 DIAGNOSIS — E87.20 ACIDOSIS, UNSPECIFIED: ICD-10-CM

## 2024-04-01 DIAGNOSIS — K38.8 OTHER SPECIFIED DISEASES OF APPENDIX: ICD-10-CM

## 2024-04-01 DIAGNOSIS — R10.11 RIGHT UPPER QUADRANT PAIN: ICD-10-CM

## 2024-04-01 DIAGNOSIS — N83.202 UNSPECIFIED OVARIAN CYST, LEFT SIDE: ICD-10-CM

## 2024-04-01 LAB
ALBUMIN SERPL ELPH-MCNC: 3.9 G/DL — SIGNIFICANT CHANGE UP (ref 3.3–5)
ALP SERPL-CCNC: 244 U/L — HIGH (ref 40–120)
ALT FLD-CCNC: 33 U/L — SIGNIFICANT CHANGE UP (ref 12–78)
ANION GAP SERPL CALC-SCNC: 6 MMOL/L — SIGNIFICANT CHANGE UP (ref 5–17)
APPEARANCE UR: CLEAR — SIGNIFICANT CHANGE UP
AST SERPL-CCNC: 33 U/L — SIGNIFICANT CHANGE UP (ref 15–37)
BACTERIA # UR AUTO: ABNORMAL /HPF
BASOPHILS # BLD AUTO: 0.02 K/UL — SIGNIFICANT CHANGE UP (ref 0–0.2)
BASOPHILS NFR BLD AUTO: 0.2 % — SIGNIFICANT CHANGE UP (ref 0–2)
BILIRUB SERPL-MCNC: 0.6 MG/DL — SIGNIFICANT CHANGE UP (ref 0.2–1.2)
BILIRUB UR-MCNC: NEGATIVE — SIGNIFICANT CHANGE UP
BUN SERPL-MCNC: 7 MG/DL — SIGNIFICANT CHANGE UP (ref 7–23)
CALCIUM SERPL-MCNC: 8.9 MG/DL — SIGNIFICANT CHANGE UP (ref 8.5–10.1)
CHLORIDE SERPL-SCNC: 102 MMOL/L — SIGNIFICANT CHANGE UP (ref 96–108)
CO2 SERPL-SCNC: 23 MMOL/L — SIGNIFICANT CHANGE UP (ref 22–31)
COLOR SPEC: YELLOW — SIGNIFICANT CHANGE UP
CREAT SERPL-MCNC: 0.84 MG/DL — SIGNIFICANT CHANGE UP (ref 0.5–1.3)
DIFF PNL FLD: NEGATIVE — SIGNIFICANT CHANGE UP
EGFR: 71 ML/MIN/1.73M2 — SIGNIFICANT CHANGE UP
EOSINOPHIL # BLD AUTO: 0.02 K/UL — SIGNIFICANT CHANGE UP (ref 0–0.5)
EOSINOPHIL NFR BLD AUTO: 0.2 % — SIGNIFICANT CHANGE UP (ref 0–6)
EPI CELLS # UR: PRESENT
GLUCOSE SERPL-MCNC: 123 MG/DL — HIGH (ref 70–99)
GLUCOSE UR QL: NEGATIVE MG/DL — SIGNIFICANT CHANGE UP
HCT VFR BLD CALC: 45 % — SIGNIFICANT CHANGE UP (ref 34.5–45)
HGB BLD-MCNC: 14.9 G/DL — SIGNIFICANT CHANGE UP (ref 11.5–15.5)
IMM GRANULOCYTES NFR BLD AUTO: 0.4 % — SIGNIFICANT CHANGE UP (ref 0–0.9)
KETONES UR-MCNC: 15 MG/DL
LACTATE SERPL-SCNC: 1 MMOL/L — SIGNIFICANT CHANGE UP (ref 0.7–2)
LACTATE SERPL-SCNC: 2.6 MMOL/L — HIGH (ref 0.7–2)
LEUKOCYTE ESTERASE UR-ACNC: ABNORMAL
LIDOCAIN IGE QN: 66 U/L — SIGNIFICANT CHANGE UP (ref 13–75)
LYMPHOCYTES # BLD AUTO: 2.04 K/UL — SIGNIFICANT CHANGE UP (ref 1–3.3)
LYMPHOCYTES # BLD AUTO: 24.6 % — SIGNIFICANT CHANGE UP (ref 13–44)
MCHC RBC-ENTMCNC: 27.2 PG — SIGNIFICANT CHANGE UP (ref 27–34)
MCHC RBC-ENTMCNC: 33.1 G/DL — SIGNIFICANT CHANGE UP (ref 32–36)
MCV RBC AUTO: 82.3 FL — SIGNIFICANT CHANGE UP (ref 80–100)
MONOCYTES # BLD AUTO: 0.75 K/UL — SIGNIFICANT CHANGE UP (ref 0–0.9)
MONOCYTES NFR BLD AUTO: 9.1 % — SIGNIFICANT CHANGE UP (ref 2–14)
NEUTROPHILS # BLD AUTO: 5.42 K/UL — SIGNIFICANT CHANGE UP (ref 1.8–7.4)
NEUTROPHILS NFR BLD AUTO: 65.5 % — SIGNIFICANT CHANGE UP (ref 43–77)
NITRITE UR-MCNC: NEGATIVE — SIGNIFICANT CHANGE UP
NRBC # BLD: 0 /100 WBCS — SIGNIFICANT CHANGE UP (ref 0–0)
PH UR: 7 — SIGNIFICANT CHANGE UP (ref 5–8)
PLATELET # BLD AUTO: 348 K/UL — SIGNIFICANT CHANGE UP (ref 150–400)
POTASSIUM SERPL-MCNC: 5.1 MMOL/L — SIGNIFICANT CHANGE UP (ref 3.5–5.3)
POTASSIUM SERPL-SCNC: 5.1 MMOL/L — SIGNIFICANT CHANGE UP (ref 3.5–5.3)
PROT SERPL-MCNC: 8.8 GM/DL — HIGH (ref 6–8.3)
PROT UR-MCNC: NEGATIVE MG/DL — SIGNIFICANT CHANGE UP
RBC # BLD: 5.47 M/UL — HIGH (ref 3.8–5.2)
RBC # FLD: 16.1 % — HIGH (ref 10.3–14.5)
RBC CASTS # UR COMP ASSIST: 1 /HPF — SIGNIFICANT CHANGE UP (ref 0–4)
SODIUM SERPL-SCNC: 131 MMOL/L — LOW (ref 135–145)
SP GR SPEC: 1.01 — SIGNIFICANT CHANGE UP (ref 1–1.03)
UROBILINOGEN FLD QL: 0.2 MG/DL — SIGNIFICANT CHANGE UP (ref 0.2–1)
WBC # BLD: 8.28 K/UL — SIGNIFICANT CHANGE UP (ref 3.8–10.5)
WBC # FLD AUTO: 8.28 K/UL — SIGNIFICANT CHANGE UP (ref 3.8–10.5)
WBC UR QL: 2 /HPF — SIGNIFICANT CHANGE UP (ref 0–5)

## 2024-04-01 PROCEDURE — 74177 CT ABD & PELVIS W/CONTRAST: CPT | Mod: 26,MC

## 2024-04-01 PROCEDURE — 99285 EMERGENCY DEPT VISIT HI MDM: CPT

## 2024-04-01 PROCEDURE — 93010 ELECTROCARDIOGRAM REPORT: CPT

## 2024-04-01 PROCEDURE — 99283 EMERGENCY DEPT VISIT LOW MDM: CPT

## 2024-04-01 RX ORDER — SODIUM CHLORIDE 9 MG/ML
1000 INJECTION INTRAMUSCULAR; INTRAVENOUS; SUBCUTANEOUS ONCE
Refills: 0 | Status: COMPLETED | OUTPATIENT
Start: 2024-04-01 | End: 2024-04-01

## 2024-04-01 RX ORDER — KETOROLAC TROMETHAMINE 30 MG/ML
15 SYRINGE (ML) INJECTION ONCE
Refills: 0 | Status: DISCONTINUED | OUTPATIENT
Start: 2024-04-01 | End: 2024-04-01

## 2024-04-01 RX ADMIN — SODIUM CHLORIDE 1000 MILLILITER(S): 9 INJECTION INTRAMUSCULAR; INTRAVENOUS; SUBCUTANEOUS at 20:26

## 2024-04-01 RX ADMIN — Medication 15 MILLIGRAM(S): at 20:26

## 2024-04-01 RX ADMIN — SODIUM CHLORIDE 1000 MILLILITER(S): 9 INJECTION INTRAMUSCULAR; INTRAVENOUS; SUBCUTANEOUS at 20:23

## 2024-04-01 RX ADMIN — Medication 15 MILLIGRAM(S): at 19:56

## 2024-04-01 NOTE — ED PROVIDER NOTE - CROS ED ROS STATEMENT
all other ROS negative except as per HPI Information: Selecting Yes will display possible errors in your note based on the variables you have selected. This validation is only offered as a suggestion for you. PLEASE NOTE THAT THE VALIDATION TEXT WILL BE REMOVED WHEN YOU FINALIZE YOUR NOTE. IF YOU WANT TO FAX A PRELIMINARY NOTE YOU WILL NEED TO TOGGLE THIS TO 'NO' IF YOU DO NOT WANT IT IN YOUR FAXED NOTE.

## 2024-04-01 NOTE — ED PROVIDER NOTE - PATIENT PORTAL LINK FT
You can access the FollowMyHealth Patient Portal offered by [facility] by registering at the following website: [email or web address]. By joining [employer]’s FollowMyHealth portal, you will also be able to view your health information using other applications (apps) compatible with our system.

## 2024-04-01 NOTE — ED ADULT NURSE NOTE - NSFALLUNIVINTERV_ED_ALL_ED
Bed/Stretcher in lowest position, wheels locked, appropriate side rails in place/Call bell, personal items and telephone in reach/Instruct patient to call for assistance before getting out of bed/chair/stretcher/Non-slip footwear applied when patient is off stretcher/[place] to call system/Physically safe environment - no spills, clutter or unnecessary equipment/Purposeful proactive rounding/Room/bathroom lighting operational, light cord in reach

## 2024-04-01 NOTE — ED ADULT NURSE REASSESSMENT NOTE - NS ED NURSE REASSESS COMMENT FT1
Pt resting on stretcher. Safety maintained. Updates given. Monitoring continued. IV site & patency inspected and integrity maintained. Universal fall precautions in place, side rails x2 raised,  socks provided, personal belongings within reach, oriented to call bell system. Continuous cardiac and SpO2 monitoring in progress. NPO status maintained pending surg consult. Pt not complaining of anything at this time. Will continue to monitor.

## 2024-04-01 NOTE — ED PROVIDER NOTE - PROVIDER TOKENS
Impression: Conjunctivitis - Allergic: H10.413. Plan: Patient educated that symptoms of ocular irritation are caused by allergies. Recommend Pataday QD OU. RTC if no improvement. Patient to seek care with PCP for pain and headache evaluation. PROVIDER:[TOKEN:[[id number]],FOLLOWUP:[1-3 Days]]

## 2024-04-01 NOTE — ED PROVIDER NOTE - OBJECTIVE STATEMENT
78-year-old female with hypertension hyperlipidemia, depression presenting to ER due to 4 days of on and off left-sided abdominal pain/flank pain.  Patient otherwise has some associated nausea but no vomiting or diarrhea noted.  No fevers or chills thus far.  Patient was diagnosed as having UTI in urgent care and otherwise is on antibiotics but not improved as yet.

## 2024-04-01 NOTE — ED ADULT NURSE NOTE - CHIEF COMPLAINT QUOTE
pt c/o RUQ abdominal pain since thursday. seen by urgent care on friday and dx: UTI, given prescription. denies nausea, vomiting.

## 2024-04-01 NOTE — ED PROVIDER NOTE - NSFOLLOWUPINSTRUCTIONS_ED_ALL_ED_FT
Pelvic Mass, Female  A pelvic mass is an abnormal growth in the pelvis. The pelvis is the area between the hip bones. It includes the bladder, rectum, uterus, and ovaries.    What are the causes?  A drawing of the female pelvic area showing the bladder, ovary, uterus, and rectum.  Common causes of pelvic masses include:  Ovarian cysts. These are fluid-filled sacs that form on an ovary.  Tumors. These may be cancerous (malignant) or noncancerous (benign). Noncancerous tumors in the uterus are called uterine fibroids.  Ectopic pregnancy. This is when the fertilized egg attaches (implants) outside the uterus.  Infections.  What are the signs or symptoms?  Many times, there are no symptoms, and the mass may be found during a routine exam. If there are symptoms, they may include:  Abdominal pain.  Nausea and vomiting.  How is this diagnosed?  Your health care provider may recommend that you have tests to diagnose the cause of the pelvic mass. The following tests may be done if a pelvic mass is found:  Physical exam.  Blood tests.  Imaging tests:  X-rays.  Ultrasound.  CT scan.  MRI.  A surgery to look inside your abdomen with cameras (laparoscopy).  A biopsy that is performed with a needle or during laparoscopy or surgery.  In some cases, what seemed like a pelvic mass may actually be something else, such as a mass in one of the organs that is near the pelvis, an infection called an abscess, or scar tissue that formed after a surgery.    A physical exam and tests may be done once, or they may be done regularly for a period of time. Exams and tests that are done regularly will help monitor whether the mass or tissue change is growing and becoming a concern.    How is this treated?  Treatment is not always needed for a pelvic mass. Your health care provider may recommend careful monitoring (watchful waiting) and regular tests and exams. Treatment will depend on the cause of the mass.    Follow these instructions at home:  What you need to do at home will depend on the cause of the mass. Follow the instructions that your health care provider gives to you. In general:  Take over-the-counter and prescription medicines only as told by your health care provider.  If you were prescribed an antibiotic medicine, take it as told by your health care provider. Do not stop taking the antibiotic even if you start to feel better.  Return to your normal activities as told by your health care provider. Ask your health care provider what activities are safe for you.  Keep all follow-up visits. This is important.  Contact a health care provider if:  You develop new symptoms.  You have a fever or chills.  You cannot tolerate prescribed medicines.  Get help right away if:  You have blood in your stools (feces) or urine.  You have an abnormal or increased amount of vaginal bleeding.  You develop sudden or worsening pain that is not relieved by medicine.  You develop severe bloating in your abdomen or your pelvis.  You cannot pass urine or stools.  Summary  A pelvic mass is an abnormal growth in the pelvis. The pelvis is the area between your hip bones. It includes the bladder, rectum, uterus, and ovaries.  Pelvic masses include ovarian cysts, tumors, ectopic pregnancy, or infections.  Your health care provider may recommend that you have tests to diagnose the cause of the pelvic mass.  Treatment will depend on the cause of the mass.  This information is not intended to replace advice given to you by your health care provider. Make sure you discuss any questions you have with your health care provider.

## 2024-04-01 NOTE — ED PROVIDER NOTE - CLINICAL SUMMARY MEDICAL DECISION MAKING FREE TEXT BOX
Pt with appendix swelling noted otherwise surgery consulted, CT also noted bilateral ovarian swelling possible mets - will dc with GYN and Surg/onc follow up for issue.

## 2024-04-01 NOTE — ED PROVIDER NOTE - CARE PROVIDER_API CALL
Masood Lopez  Surgery  [address] [place] [id number]  Phone: [phone]  Fax: [phone]  Follow Up Time: 1-3 Days

## 2024-04-01 NOTE — CONSULT NOTE ADULT - SUBJECTIVE AND OBJECTIVE BOX
Translation  and history provided by Daughter( Kimberly Olson) at pt's request                                                                                            General Surgery Consult      HPI: 78-year-old female with hypertension hyperlipidemia, depression on weekly ECT presenting to ER due to 4 days of episodic left-sided abdominal pain/flank pain.  Patient otherwise has some associated nausea but no vomiting, constipation or diarrhea noted.  No fevers or chills thus far.  Patient was diagnosed as having UTI in urgent care and  is on antibiotics, Denies recent colonoscopy, last was >10yrs ago. Denies seeing GYN due to age. Denies any bleeding per rectum.     Review of Systems:  · EYES: no discharge, no irritation, no pain, no redness, and no visual changes.  · ENMT: Ears: no ear pain and no hearing problems. Nose: no nasal congestion and no nasal drainage. Mouth/Throat: no dysphagia, no hoarseness and no throat pain. Neck: no lumps, no pain, no stiffness and no swollen glands.  · CARDIOVASCULAR: no chest pain and no edema.  · GASTROINTESTINAL: - - -  · Gastrointestinal [+]: ABDOMINAL PAIN, early satiety   · Gastrointestinal [-]: no nausea, no vomiting  · ROS STATEMENT: all other ROS negative except as per HPI    PAST MEDICAL HISTORY:  Depression  HLD (hyperlipidemia)  HTN (hypertension)        PAST SURGICAL HISTORY:  No significant past surgical history    MEDICATIONS:      ALLERGIES:  No Known Allergies      VITALS & I/Os:  Vital Signs Last 24 Hrs  T(C): 37.1 (2024 00:04), Max: 37.2 (2024 15:41)  T(F): 98.7 (2024 00:04), Max: 99 (2024 15:41)  HR: 91 (2024 00:04) (91 - 120)  BP: 139/85 (2024 00:04) (139/85 - 151/86)  BP(mean): --  RR: 16 (2024 00:04) (16 - 18)  SpO2: 99% (2024 00:04) (96% - 99%)    Parameters below as of 2024 00:04  Patient On (Oxygen Delivery Method): room air        I&O's Summary      PHYSICAL EXAM:  · CONSTITUTIONAL: Well appearing, awake, alert, oriented to person, place, time/situation and in no apparent distress.  · ENMT: Airway patent, Nasal mucosa clear. Mouth with normal mucosa. Throat has no vesicles, no oropharyngeal exudates and uvula is midline.  · EYES: Clear bilaterally, pupils equal, round and reactive to light.  · CARDIAC: Normal rate, regular rhythm.  Heart sounds S1, S2.  No murmurs, rubs or gallops.  · RESPIRATORY: Breath sounds clear and equal bilaterally.  · GASTROINTESTINAL:  · Abdominal Exam: +BSx4, soft, LLQ abd tenderness to deep palpation, palpable mass in llq, no McBurney's point tenderness,  no rebound, no guarding or rigidity, no CVA tenderness, mild RLQ abd tenderness, no RUQ tenderness.  · MUSCULOSKELETAL: Spine appears normal, range of motion is not limited, no muscle or joint tenderness  · NEUROLOGICAL: Alert and oriented, no focal deficits, no motor or sensory deficits.  · SKIN: Skin normal color for race, warm, dry and intact. No evidence of rash.      LABS:                        14.9   8.28  )-----------( 348      ( 2024 18:43 )             45.0         131<L>  |  102  |  7   ----------------------------<  123<H>  5.1   |  23  |  0.84    Ca    8.9      2024 18:43    TPro  8.8<H>  /  Alb  3.9  /  TBili  0.6  /  DBili  x   /  AST  33  /  ALT  33  /  AlkPhos  244<H>      Lactate: Lactate, Blood: 1.0 mmol/L ( @ 22:45)  Lactate, Blood: 2.6 mmol/L ( @ 18:43)               Urinalysis Basic - ( 2024 18:43 )    Color: Yellow / Appearance: Clear / S.006 / pH: x  Gluc: 123 mg/dL / Ketone: 15 mg/dL  / Bili: Negative / Urobili: 0.2 mg/dL   Blood: x / Protein: Negative mg/dL / Nitrite: Negative   Leuk Esterase: Trace / RBC: 1 /HPF / WBC 2 /HPF   Sq Epi: x / Non Sq Epi: x / Bacteria: Occasional /HPF        IMAGING:  < from: CT Abdomen and Pelvis w/ IV Cont (24 @ 20:14) >  FINDINGS: Motion artifacts degrade some of the imaging.  LOWER CHEST: Mild cardiomegaly. Aortic and coronary artery calcifications.  Subcarinal subcentimeter calcified lymph nodes and left lower lobe   calcified granuloma.  Mild bibasilar subsegmental atelectasis. Respiratory motion artifact    LIVER: Mild focal fat deposition adjacent to the falciform ligament.  BILE DUCTS: Normal caliber.  GALLBLADDER: Nondistended  SPLEEN: Within normal limits.  PANCREAS: Within normal limits.  ADRENALS: Within normal limits.  KIDNEYS/URETERS: Symmetric renal enhancement without hydronephrosis.    BLADDER: Distended  REPRODUCTIVE ORGANS: Bilateral adnexal multiloculated cystic lesions   approximately measuring 7.1 x 4.7 x 5.8 cm on the left and 6.5 x 3.3 x   3.7 cm on the right Atrophic uterus.  Dilated ovary veins and periuterine lower pelvic vasculature as can be   seen with pelvic congestion syndrome in the correct clinical scenario   however this finding has also been described in asymptomatic patients.    BOWEL: No bowel obstruction. Appendix is dilated measuring up to 1.5 cm   and fluid-filled with an area of nodular wall thickening. No   periappendiceal inflammation  Decompressed stomach suggests wall thickening however this may be   accentuated by decompressed status.  PERITONEUM: No ascites.  VESSELS: Atherosclerotic changes.  RETROPERITONEUM/LYMPH NODES: No lymphadenopathy.  ABDOMINAL WALL: No significant acute abnormality.  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 Recommend surgical referral    Bilateral adnexal multiloculated cystic lesions are suspicious for   bilateral ovarian metastases rather than primary malignancy.

## 2024-04-01 NOTE — ED ADULT NURSE NOTE - OBJECTIVE STATEMENT
78 year old female A/Ox3 c/o abdominal pain x4 days, daughter reports pt has been complaining of intermittent pain for the last 4 days, at this time pt denies any c/o pain, daughter reports pt has received Advil for with pain with some relief, pt denies n/v, fever/chills, headache, chest pain, daughter reports pt was diagnosed with UTI on friday and currently on abx

## 2024-04-01 NOTE — ED ADULT NURSE REASSESSMENT NOTE - NS ED NURSE REASSESS COMMENT FT1
Handoff report received from RN Ni for shift change. Plan of care reviewed. Pt received resting on stretcher. VSS. IV RT ac 20G patent. Pt lidia at bedside translating Samoan. Pt NAD. Continuous cardiac and SpO2 monitoring in progress. VSS. Pt complaining of left abd pain 7/10, dr garcia aware. Topical Sulfur Applications Counseling: Topical Sulfur Counseling: Patient counseled that this medication may cause skin irritation or allergic reactions.  In the event of skin irritation, the patient was advised to reduce the amount of the drug applied or use it less frequently.   The patient verbalized understanding of the proper use and possible adverse effects of topical sulfur application.  All of the patient's questions and concerns were addressed.

## 2024-04-01 NOTE — CONSULT NOTE ADULT - ASSESSMENT
78-year-old female with hypertension hyperlipidemia, depression on weekly ECT presenting to ER due to 4 days of episodic left-sided abdominal pain/flank pain now resolved, found to have dilated fluid-filled appendix measuring 1.5cm without evidence of acute appendicitis ot RLQ pain, Appendix also with nodular wall thickening concerning for carcinoma. Also wit B/L multiloculated cystic nodules of ovaries. pt with elevated lactate that has normalized with hydration.  as discussed with Dr. Ramos, no acute surgical intervention at this time, pt require further workup with Surgical oncology (Dr. Lopez) also with GYN/onc teams.  GI consult for possible colonoscopy  analgesic PRN  discussed with Daughter at bedside  cont abx therapy for uti  f/u with PMD tomorrow to discuss new findings  plan discussed with Dr. Marte

## 2024-04-02 VITALS
OXYGEN SATURATION: 99 % | DIASTOLIC BLOOD PRESSURE: 85 MMHG | HEART RATE: 91 BPM | SYSTOLIC BLOOD PRESSURE: 139 MMHG | TEMPERATURE: 99 F | RESPIRATION RATE: 16 BRPM

## 2024-04-02 LAB
CULTURE RESULTS: SIGNIFICANT CHANGE UP
SPECIMEN SOURCE: SIGNIFICANT CHANGE UP

## 2024-04-02 PROCEDURE — 90870 ELECTROCONVULSIVE THERAPY: CPT

## 2024-04-02 NOTE — ECT TREATMENT NOTE - NSECTCOMMENTS_PSY_ALL_CORE
2nd weekly tx, interviewed with  [id number]. Patient stable through the entire treatment interval without recurrence of presenting symptoms. Mood, energy, sleep, and appetite were within normal limits. Smiling and expressing gratitude. Cognition score low today but patient conversant, linear per  and per daughter the patient is only cognitively impacted on the day of treatment. We will continue maintenance ECT to help reduce the chances of relapse. Per daughter she is doing well and enjoys time with her family but has not gone to the adult day center for a year; she hopes patient will return to socialize more.

## 2024-04-02 NOTE — ED ADULT NURSE REASSESSMENT NOTE - NS ED NURSE REASSESS COMMENT FT1
Pt able to ambulate safely and steadily w/out assistance, denies dizziness/weakness upon standing, IV removed, pt d/c home w/ family. Education deemed successful at time of discharge when patient provided a successful teach back demonstration. no acute distress noted. respirations even and unlabored.

## 2024-04-02 NOTE — ECT AMBULATORY DISCHARGE PLAN - NSDCPNDISPN_GEN_ALL_CORE
Education provided on the pain management plan of care

## 2024-04-08 PROCEDURE — 90870 ELECTROCONVULSIVE THERAPY: CPT

## 2024-04-08 NOTE — ECT TREATMENT NOTE - NSECTCOMMENTS_PSY_ALL_CORE
3rd weekly treatment. Interviewed with  [id number]. Patient stable through most of the week but noted some bouts of decrement in mood at the very end. However she pushed these thoughts away and reported that she is very happy overall about the birth of her grandchild. Smiling and gracious with staff. We will continue maintenance ECT to help reduce the chances of relapse.

## 2024-04-11 ENCOUNTER — APPOINTMENT (OUTPATIENT)
Dept: SURGICAL ONCOLOGY | Facility: CLINIC | Age: 79
End: 2024-04-11
Payer: MEDICARE

## 2024-04-11 VITALS
SYSTOLIC BLOOD PRESSURE: 158 MMHG | DIASTOLIC BLOOD PRESSURE: 94 MMHG | HEART RATE: 100 BPM | WEIGHT: 108 LBS | HEIGHT: 55 IN | OXYGEN SATURATION: 98 % | BODY MASS INDEX: 24.99 KG/M2

## 2024-04-11 PROCEDURE — 99205 OFFICE O/P NEW HI 60 MIN: CPT

## 2024-04-11 NOTE — CONSULT LETTER
[Dear  ___] : Dear  [unfilled], [Consult Letter:] : I had the pleasure of evaluating your patient, [unfilled]. [Please see my note below.] : Please see my note below. [Consult Closing:] : Thank you very much for allowing me to participate in the care of this patient.  If you have any questions, please do not hesitate to contact me. [Sincerely,] : Sincerely, [FreeTextEntry2] : Ana Mariano MD [FreeTextEntry3] : Masood Lopez MD Surgical Oncology [facility] Office: [phone] Cell: [phone]

## 2024-04-11 NOTE — ASSESSMENT
[FreeTextEntry1] : We discussed the need for further evaluation of the appendiceal and adenexal masses.  Fatmata will undergo bloodwork (including CEA, and Ca125) today.  She will also get a PET CT.  She will see Gyn Oncology, and GI, and begin Prehabilitation.  She will return in 3 weeks.   All medical entries were at my, Dr. Masood Lopez, direction. I have reviewed the chart and agree that the record accurately reflects my personal performance of the history, physical exam, assessment, and plan.  Our office nurse practitioner was present for the duration of the office visit.

## 2024-04-11 NOTE — PHYSICAL EXAM
[Normal] : supple, no neck mass and thyroid not enlarged [Normal Neck Lymph Nodes] : normal neck lymph nodes  [Normal Supraclavicular Lymph Nodes] : normal supraclavicular lymph nodes [Normal Groin Lymph Nodes] : normal groin lymph nodes [Normal Axillary Lymph Nodes] : normal axillary lymph nodes [Normal] : not distended, non tender, no masses, no hepatosplenomegaly [de-identified] : softly distended abdomen

## 2024-04-11 NOTE — HISTORY OF PRESENT ILLNESS
[de-identified] : Ms. FATMAAT GRANT is a 78-year-old woman, referred by Dr. Ana Mariano for consultation regarding a pelvic mass, here for an initial consultation.  Fatmata is primarily Sinhala speaking, accompanied by her 3 daughters, Renate Harris & Joselyn, who assist with translation.   Fatmata reports diffuse and severe LUQ pain that started in early 2024, eventually prompting an ED visit to [facility] in April.   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 Menopause 50's. . Age of first full-term pregnancy 24. No OCP/HRT use. last colonoscopy >10 years prior and WNL.  ECOG 1-2  2024 - Fatmata is here for an initial consultation, accompanied by her 3 daughters, Kimberly Dewitt & Joselyn. Fatmata's memory waxes and wanes at times, mostly from the ECT side effects but this also started before her treatments. She reports diffuse/severe LUQ/Left flank abdominal pain that started in early March and eventually prompted a visit to the ED. The pain is also prominent in her pelvic region. Her appetite has been fairly maintained, and she has had ~5 lbs of unintentional weight loss since last month. She denies any nausea, vomiting, diarrhea or vaginal bleeding/cramping. The pain is fairly well managed with Advil.

## 2024-04-12 ENCOUNTER — APPOINTMENT (OUTPATIENT)
Dept: GASTROENTEROLOGY | Facility: CLINIC | Age: 79
End: 2024-04-12
Payer: MEDICARE

## 2024-04-12 ENCOUNTER — NON-APPOINTMENT (OUTPATIENT)
Age: 79
End: 2024-04-12

## 2024-04-12 DIAGNOSIS — K59.00 CONSTIPATION, UNSPECIFIED: ICD-10-CM

## 2024-04-12 DIAGNOSIS — R14.0 ABDOMINAL DISTENSION (GASEOUS): ICD-10-CM

## 2024-04-12 PROCEDURE — 99442: CPT

## 2024-04-12 RX ORDER — POLYETHYLENE GLYCOL 3350 AND ELECTROLYTES WITH LEMON FLAVOR 236; 22.74; 6.74; 5.86; 2.97 G/4L; G/4L; G/4L; G/4L; G/4L
236 POWDER, FOR SOLUTION ORAL
Qty: 1 | Refills: 0 | Status: ACTIVE | COMMUNITY
Start: 2024-04-12 | End: 1900-01-01

## 2024-04-15 PROBLEM — R14.0 ABDOMINAL BLOATING: Status: ACTIVE | Noted: 2024-04-12

## 2024-04-15 PROBLEM — K59.00 CONSTIPATION: Status: ACTIVE | Noted: 2024-04-12

## 2024-04-15 PROCEDURE — 90870 ELECTROCONVULSIVE THERAPY: CPT

## 2024-04-15 NOTE — ECT TREATMENT NOTE - NSECTCOMMENTS_PSY_ALL_CORE
This is the 4th weekly treatment. Patient reported that she's feeling much better. Said that she's happy most of the time but very occasional moments of sadness. Has been going out, watering the plans and spending time with her family. Talked to her sister who confirmed that patient has been doing well psychiatrically. On the other hand, sister reported that she was taken to the ED due to abdominal pain and she was found to have potentially a mass and she's scheduled for a colonoscopy and a PET scan. Patient is not aware of these findings. Will space to sessions every 2 weeks as per treatment schedule and pt's sister is also asking to have patient come in 2 weeks so they can complete all workup.

## 2024-04-16 RX ORDER — SODIUM CHLORIDE 9 MG/ML
500 INJECTION, SOLUTION INTRAVENOUS
Refills: 0 | Status: DISCONTINUED | OUTPATIENT
Start: 2024-04-19 | End: 2024-05-04

## 2024-04-18 NOTE — ASU PATIENT PROFILE, ADULT - REASON FOR ADMISSION, PROFILE
Occupational Therapy    Visit Type: treatment  SUBJECTIVE  Patient agreed to participate in therapy this date.  RN in agreement to work with patient for therapy session. Patient's family in agreement to work with patient for therapy session.  Patient verbally agrees to allow the following to be present during session: relative (sister)  I will try.   Patient / Family Goal: maximize function and return home    Pain   Patient reports pain is not an issue/concern.    OBJECTIVE     Cognitive Status   Level of Consciousness   - alert  Arousal Alertness   - delayed responses to stimuli  Affect/Behavior    - cooperative and calm  Orientation    - Oriented to: person and place   - Disoriented to: situation and time  Functional Communication   - Overall Status: impaired   - Forms of Communication: verbal and delayed responses  Attention Span    - Attention: - attends with cues to redirect  Following Direction   - follows one step commands with increased time and follows one step commands with repetition  Transition Between Tasks   - transitions with cues  Memory   - - decreased recall of recent events impaired  Safety Awareness/Insight   - decreased awareness of need for assistance  Awareness of Deficits   - decreased awareness of deficits  Patient alert, yet slow response time noted. Patient with flat affect. Patient did know the year, needed cues for accuracy for other aspects of time.       Sitting Balance  (RAS = base of support)  Static      - Trial 1 (sec): 5 (minutes)     - Trial 1 details: contact guard  Patient required moderate to maximal assist to shift weight while sitting at the edge of bed.         Bed Mobility  - Supine to sit: maximal assist, with tactile cues, with verbal cues  Transfers  Assistive devices: gait belt, 1 person, non-mechanical sit to stand lift  - Sit to stand: moderate assist, with verbal cues, with tactile cues  - Stand to sit: moderate assist, with tactile cues, with verbal cues  Patient  required moderate assist of one person for sit to stand from the edge of bed to the laney stedy. Once in the laney stedy, patient remained standing while writer transitioning patient to the chair. Patient required moderate assist to sit down to avoid rapid descent.       Activities of Daily Living (ADLs)  Eating:   - Assist: set up and supervision  - Position: chair  - Assist needed for: increased time to complete, verbal cueing and set up  Grooming/Oral Hygiene:   - Grooming assist: minimal assist (to wash his face)  Lower Body Dressing:   - Footwear:       - Assistance: total assist - non-dependent       - Position: edge of bed       - Type: socks  Patient had difficulty bending forward to don socks.   Interventions    Training provided: activity tolerance, ADL training, balance retraining, bed mobility training, transfer training and positioningPatient educated on appropriate activity level progression and nurse educated on safe patient handling techniques.     Skilled input: verbal instruction/cues and tactile instruction/cues  Verbal Consent: Writer verbally educated and received verbal consent for hand placement, positioning of patient, and techniques to be performed today from patient for therapist position for techniques as described above and how they are pertinent to the patient's plan of care.         Education:   - Present and ready to learn: patient  Education provided during session:  - Role of occupational therapy, plan of care, fall and safety precautions while in hospital.    - Stroke Education: signs and symptoms of stroke  - Results of above outlined education: Needs reinforcement    ASSESSMENT   Progress: progressing toward goals  Interferring components: decreased activity tolerance, decreased insight into deficit and cognitive deficits    Discharge needs based on today's assessment:  - Current level of function: significantly below baseline level of function  - Therapy needs at discharge: therapy  5 or more times per week  - Activities of daily living (ADLs) requiring support at discharge: transfers, dressing, grooming, bathing, toileting, ambulation and bed mobility  - Impairments that require further therapy intervention: strength, activity tolerance, balance, safety awareness and cognition  AM-PAC  - Prior Level of Function: Needs a little help ([facility] 12-21)       Key: MOD A=moderate assistance, IND/MOD I=independent/modified independent  - Generalized Current Level of Function     - Current Self-Cares: 16       Scoring Key= >21 Modified Independent; 20-21 Supervision; 18-19 Minimal assist; 13-18 Moderate assist; 9-12 Max assist; <9 Total assist    Therapy Diagnosis:   Decreased ability to perform self care tasks and functional transfers.             PLAN (while hospitalized)  Suggestions for next session as indicated: Progression of ADLs and functional transfer completion.     OT Frequency: 3-5 x per week      PT/OT Mobility Equipment for Discharge: Pt owned RW  PT/OT ADL Equipment for Discharge: TBD    A minimum of 8 minutes per session x 1 week in the acute setting.     Agreement to plan and goals: patient agrees with goals and treatment plan      GOALS  Review Date: 10/31/2023  Long Term Goals: (to be met by time of discharge from hospital)    Long Term Goals: (to be met by time of discharge from hospital)  Grooming: Patient will complete grooming tasks in standing and at sink supervision.  Status: progressing/ongoing  Lower body dressing: Patient will complete lower body dressing in sitting supervision.  Status: partially met   Toileting: Patient will complete toileting supervision.  Status: progressing/ongoing  Toilet transfer: Patient will complete toilet transfer with gait belt and 2-wheeled walker, supervision.  Status: progressing/ongoing        Documented in the chart in the following areas: Assessment/Plan.    Patient at End of Session:   Location: in chair  Safety measures: alarm system in  place/re-engaged, call light within reach and lines intact  Handoff to: nurse and family/caregiver (Spoke with Lu, nurse. Medical team in room at end of session.)      Therapy procedure time and total treatment time can be found documented on the Time Entry flowsheet   colon mass

## 2024-04-19 ENCOUNTER — RESULT REVIEW (OUTPATIENT)
Age: 79
End: 2024-04-19

## 2024-04-19 ENCOUNTER — TRANSCRIPTION ENCOUNTER (OUTPATIENT)
Age: 79
End: 2024-04-19

## 2024-04-19 ENCOUNTER — OUTPATIENT (OUTPATIENT)
Dept: OUTPATIENT SERVICES | Facility: HOSPITAL | Age: 79
LOS: 1 days | Discharge: HOME | End: 2024-04-19
Payer: MEDICARE

## 2024-04-19 ENCOUNTER — APPOINTMENT (OUTPATIENT)
Dept: GASTROENTEROLOGY | Facility: HOSPITAL | Age: 79
End: 2024-04-19

## 2024-04-19 VITALS
WEIGHT: 113.1 LBS | TEMPERATURE: 98 F | OXYGEN SATURATION: 99 % | HEART RATE: 111 BPM | HEIGHT: 55 IN | RESPIRATION RATE: 16 BRPM

## 2024-04-19 VITALS
SYSTOLIC BLOOD PRESSURE: 121 MMHG | RESPIRATION RATE: 16 BRPM | HEART RATE: 82 BPM | DIASTOLIC BLOOD PRESSURE: 82 MMHG | OXYGEN SATURATION: 96 %

## 2024-04-19 DIAGNOSIS — R14.0 ABDOMINAL DISTENSION (GASEOUS): ICD-10-CM

## 2024-04-19 PROCEDURE — 88305 TISSUE EXAM BY PATHOLOGIST: CPT | Mod: 26

## 2024-04-19 PROCEDURE — 45380 COLONOSCOPY AND BIOPSY: CPT | Mod: 59

## 2024-04-19 PROCEDURE — 43239 EGD BIOPSY SINGLE/MULTIPLE: CPT

## 2024-04-19 PROCEDURE — 45385 COLONOSCOPY W/LESION REMOVAL: CPT

## 2024-04-19 RX ORDER — ACETAMINOPHEN 500 MG
1 TABLET ORAL
Refills: 0 | DISCHARGE

## 2024-04-19 NOTE — ASU DISCHARGE PLAN (ADULT/PEDIATRIC) - NS MD DC FALL RISK RISK
For information on Fall & Injury Prevention, visit: [email or web address] OR  [email or web address] OR  https://www.cdc.gov/steadi/patient.html

## 2024-04-19 NOTE — PRE PROCEDURE NOTE - PRE PROCEDURE EVALUATION
Attending Physician:  Lona                          Procedure: EGD/Colonoscopy    Indication for Procedure:  bloating, bilateral adnexal and appendiceal mass  ________________________________________________________  PAST MEDICAL & SURGICAL HISTORY:  Depression      HLD (hyperlipidemia)      HTN (hypertension)      No significant past surgical history        ALLERGIES:  No Known Allergies    HOME MEDICATIONS:  acetaminophen 500 mg oral capsule: 1 cap(s) orally every 6 hours  amLODIPine 5 mg oral tablet: 1 tab(s) orally once a day  atorvastatin:   buPROPion 100 mg/12 hours (SR) oral tablet, extended release: 1 tab(s) orally once a day  lisinopril 20 mg oral tablet: 1 tab(s) orally once a day  seroquel:   sertraline 100 mg oral tablet: 2 tab(s) orally once a day    AICD/PPM: [ ] yes   [x] no    PERTINENT LAB DATA:                      PHYSICAL EXAMINATION:    T(C): --  HR: --  BP: --  RR: --  SpO2: --    Constitutional: NAD  HEENT: PERRLA, EOMI,    Neck:  No JVD  Respiratory: CTAB/L  Cardiovascular: S1 and S2  Gastrointestinal: BS+, soft, NT/ND  Extremities: No peripheral edema  Neurological: A/O x 3, no focal deficits  Psychiatric: Normal mood, normal affect  Skin: No rashes    ASA Class: I [ ]  II [ ]  III [x]  IV [ ]    COMMENTS:    The patient is a suitable candidate for the planned procedure unless box checked [ ]  No, explain:

## 2024-04-21 ENCOUNTER — APPOINTMENT (OUTPATIENT)
Dept: NUCLEAR MEDICINE | Facility: IMAGING CENTER | Age: 79
End: 2024-04-21
Payer: MEDICARE

## 2024-04-21 ENCOUNTER — OUTPATIENT (OUTPATIENT)
Dept: OUTPATIENT SERVICES | Facility: HOSPITAL | Age: 79
LOS: 1 days | End: 2024-04-21
Payer: MEDICARE

## 2024-04-21 DIAGNOSIS — K38.8 OTHER SPECIFIED DISEASES OF APPENDIX: ICD-10-CM

## 2024-04-21 PROCEDURE — A9552: CPT

## 2024-04-21 PROCEDURE — 78815 PET IMAGE W/CT SKULL-THIGH: CPT

## 2024-04-21 PROCEDURE — 78815 PET IMAGE W/CT SKULL-THIGH: CPT | Mod: 26,PI

## 2024-04-25 LAB — SURGICAL PATHOLOGY STUDY: SIGNIFICANT CHANGE UP

## 2024-04-29 PROCEDURE — 90870 ELECTROCONVULSIVE THERAPY: CPT

## 2024-04-29 NOTE — ECT TREATMENT NOTE - NSECTCOMMENTS_PSY_ALL_CORE
Talked to patient in Nepali. This is the first q 2 weeks session. She states that she's been doing well. Said that she feels happy overall with just very occasional moments of sadness. Reports good sleep and appetite. Said that she's been going for some tests. She recently had colonoscopy, MRI and pet scan for a lesion in lower abdomen. Talked to sister and said that they are going to see the oncologist soon but said that the dx is not out yet and is not confirmed. Pt is not totally aware of the potential diagnosis. Next session in 2 weeks.

## 2024-04-29 NOTE — ECT AMBULATORY DISCHARGE PLAN - NSPOSTECTADDINSTRFUCARE_PSY_ALL_CORE
Telephone call from [facility]. They are asking that the hydrocodone script be sent to the [facility] on [address] because our pharmacy does not cover his insurance.   Treatment plan due - please follow up with MD.

## 2024-04-30 PROBLEM — N94.89 ADNEXAL MASS: Status: ACTIVE | Noted: 2024-04-11

## 2024-04-30 NOTE — CHIEF COMPLAINT
[FreeTextEntry1] :   New Patient Consultation for pelvic mass, combined surgical procedure with Dr. Lopez.

## 2024-04-30 NOTE — HISTORY OF PRESENT ILLNESS
[FreeTextEntry1] : Surg Onc/Ref:  Dr. Masood Lopez   Ms. DARCIE GRANT, 78-years old woman, referred for consultation for a combined surgical procedure regarding a newly diagnosed pelvic mass.  Ms. Grant began experiencing abdominal pain and presented to ED at [facility].  Workup included imaging identifying a b/l ovarian masses as well as an appendiceal mass.    2024 PET CT ([facility]) HEAD/NECK: Stable FDG avid thyroid nodules bilaterally, with the largest  in the left thyroid lobe with focal FDG activity up to SUV 14.3 (image  79). THORAX: No abnormal FDG activity. Calcified mediastinal and hilar lymph  nodes. LUNGS: Calcified left lung granulomas. Scattered subsegmental atelectasis  predominantly in the right lower lobe. Subpleural opacities at the right  upper, middle, and lower lobes with mild FDG activity up to SUV 4.8  (image 112), likely inflammatory process. Reassess on follow-up. PLEURA/PERICARDIUM: No abnormal FDG activity. No effusion. HEPATOBILIARY/PANCREAS: Physiologic FDG activity.  For reference, normal  liver demonstrates SUV mean 2.7. Nondistended or surgically absent gallbladder. SPLEEN: Physiologic FDG activity. Normal in size. ADRENAL GLANDS: No abnormal FDG activity. No nodule. KIDNEYS/URINARY BLADDER: Physiologic excreted FDG activity. REPRODUCTIVE ORGANS: No abnormal FDG activity. Non-FDG avid  multiloculated bilateral adnexal cystic lesions, greater on the left,  unchanged since 2024. ABDOMINOPELVIC LYMPH NODES/RETROPERITONEUM: No enlarged or FDG-avid lymph node. ESOPHAGUS/STOMACH/BOWEL/PERITONEUM/MESENTERY: Similar dilated appendix,  unchanged since 2024, without FDG avidity above background levels.  Small focal regions of mild activity at the ileocecal wall, nonspecific  and favored physiological, and can be correlated with recent colonoscopy. VESSELS: Atherosclerotic changes in the aorta and its branches. No  aneurysm. Coronary artery calcifications. BONES/SOFT TISSUES: Indeterminate FDG uptake in soft tissues of the left  inguinal region, SUV 4 (image 218), possibly prior inguinal hernia repair. Ventral abdominal wall hernia containing loops of nonobstructed bowel. Benign musculoskeletal uptake. Multiple thoracic and lumbar compression fractures. IMPRESSION: 1.  Nonavid distended appendix, unchanged since 2024. Note that PET is relatively insensitive for mucinous neoplasms. 2.  Nonavid multiloculated bilateral adnexal cystic lesions, unchanged since 2024. 3.  Enlarged thyroid gland with multiple FDG avid nodules. Recommend ultrasound to evaluate for signs of malignancy. Also recommend correlation with TSH. 4.  Mildly FDG avid right lung subpleural opacities, likely inflammatory in nature. Reassess on follow-up. 5.  Multiple thoracic and lumbar compression fractures. 6.  Indeterminate FDG uptake in soft tissues in the left inguinal region, etiology unclear. Query prior inguinal hernia repair.  2024 CT A/P ([facility]) FINDINGS: Motion artifacts degrade some of the imaging. LOWER CHEST: Mild cardiomegaly. Aortic and coronary artery calcifications. Subcarinal subcentimeter calcified lymph nodes and left lower lobe calcified granuloma. Mild bibasilar subsegmental atelectasis. Respiratory motion artifact LIVER: Mild focal fat deposition adjacent to the falciform ligament. BILE DUCTS: Normal caliber. GALLBLADDER: Nondistended SPLEEN: Within normal limits. PANCREAS: Within normal limits. ADRENALS: Within normal limits. KIDNEYS/URETERS: Symmetric renal enhancement without hydronephrosis. BLADDER: Distended REPRODUCTIVE ORGANS: Bilateral adnexal multiloculated cystic lesions approximately measuring 7.1 x 4.7 x 5.8 cm on the left and 6.5 x 3.3 x 3.7 cm on the right Atrophic uterus. Dilated ovary veins and periuterine lower pelvic vasculature as can be seen with pelvic congestion syndrome in the correct clinical scenario however this finding has also been described in asymptomatic patients. BOWEL: No bowel obstruction. Appendix is dilated measuring up to 1.5 cm and fluid-filled with an area of nodular wall thickening. No periappendiceal inflammation Decompressed stomach suggests wall thickening however this may be accentuated by decompressed status. PERITONEUM: No ascites. VESSELS: Atherosclerotic changes. RETROPERITONEUM/LYMPH NODES: No lymphadenopathy. ABDOMINAL WALL: No significant acute abnormality. 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 Recommend surgical referral Bilateral adnexal multiloculated cystic lesions are suspicious for bilateral ovarian metastases rather than primary malignancy.  24  Colonoscopy/Endoscopy 1. Apendiceal orifice, biopsy - Mildly hyperplastic colonic mucosa - No evidence of mucinous lesion/neoplasm in this sampling 2. Cecum, polypectomy - Hyperplastic polyp 3. Sigmoid colon, polyp, polypectomy - Tubular adenoma 4. Stomach, biopsy - Gastric antral mucosa with chronic inactive gastritis - No morphologic evidence of Helicobacter microorganisms - Negative for intestinal metaplasia   PMH: severe depression - tx w/ ECT weekly (then biweekly starting 4/15), anxiety, panic disorder, HLD, HTN, osteoporosis PSH: right inguinal & umbilical hernia repairs >5 years ago Meds: Prolia, Calcium supplements, Bupropion, Lisinopril, Quetiapine, Sertraline ALL: NKA SH: denies tobacco or ETOH use, lives with her daughter Kimberly FH: daughter w/ breast cancer GYN: Menarche 11. Menopause 50's. . Age of first full-term pregnancy 24. No OCP/HRT use. last colonoscopy >10 years prior and WNL. ECOG 1-2  Health Maintenance: Mammogram: Colonoscopy: 2024 Endoscopy/Colonoscopy DEXA: PAP:

## 2024-04-30 NOTE — PHYSICAL EXAM
[Chaperone Present] : A chaperone was present in the examining room during all aspects of the physical examination [48893] : A chaperone was present during the pelvic exam. [Normal] : Recto-Vaginal Exam: Normal [Fully active, able to carry on all pre-disease performance without restriction] : Status 0 - Fully active, able to carry on all pre-disease performance without restriction

## 2024-05-01 ENCOUNTER — APPOINTMENT (OUTPATIENT)
Dept: GYNECOLOGIC ONCOLOGY | Facility: CLINIC | Age: 79
End: 2024-05-01
Payer: MEDICARE

## 2024-05-01 VITALS
WEIGHT: 107 LBS | DIASTOLIC BLOOD PRESSURE: 102 MMHG | SYSTOLIC BLOOD PRESSURE: 179 MMHG | BODY MASS INDEX: 24.76 KG/M2 | HEART RATE: 111 BPM | HEIGHT: 55 IN

## 2024-05-01 DIAGNOSIS — Z80.9 FAMILY HISTORY OF MALIGNANT NEOPLASM, UNSPECIFIED: ICD-10-CM

## 2024-05-01 DIAGNOSIS — Z86.79 PERSONAL HISTORY OF OTHER DISEASES OF THE CIRCULATORY SYSTEM: ICD-10-CM

## 2024-05-01 DIAGNOSIS — Z86.59 PERSONAL HISTORY OF OTHER MENTAL AND BEHAVIORAL DISORDERS: ICD-10-CM

## 2024-05-01 DIAGNOSIS — N94.89 OTHER SPECIFIED CONDITIONS ASSOCIATED WITH FEMALE GENITAL ORGANS AND MENSTRUAL CYCLE: ICD-10-CM

## 2024-05-01 DIAGNOSIS — Z87.39 PERSONAL HISTORY OF OTHER DISEASES OF THE MUSCULOSKELETAL SYSTEM AND CONNECTIVE TISSUE: ICD-10-CM

## 2024-05-01 PROCEDURE — 99459 PELVIC EXAMINATION: CPT

## 2024-05-01 PROCEDURE — 99204 OFFICE O/P NEW MOD 45 MIN: CPT

## 2024-05-01 RX ORDER — QUETIAPINE FUMARATE 400 MG/1
TABLET ORAL
Refills: 0 | Status: ACTIVE | COMMUNITY

## 2024-05-01 RX ORDER — LISINOPRIL 30 MG/1
TABLET ORAL
Refills: 0 | Status: ACTIVE | COMMUNITY

## 2024-05-01 RX ORDER — DENOSUMAB 60 MG/ML
INJECTION SUBCUTANEOUS
Refills: 0 | Status: ACTIVE | COMMUNITY

## 2024-05-01 RX ORDER — SERTRALINE HYDROCHLORIDE 25 MG/1
TABLET, FILM COATED ORAL
Refills: 0 | Status: ACTIVE | COMMUNITY

## 2024-05-01 RX ORDER — BUPROPION HYDROCHLORIDE 100 MG/1
TABLET, FILM COATED ORAL
Refills: 0 | Status: ACTIVE | COMMUNITY

## 2024-05-02 ENCOUNTER — NON-APPOINTMENT (OUTPATIENT)
Age: 79
End: 2024-05-02

## 2024-05-02 ENCOUNTER — APPOINTMENT (OUTPATIENT)
Dept: SURGICAL ONCOLOGY | Facility: CLINIC | Age: 79
End: 2024-05-02
Payer: MEDICARE

## 2024-05-02 PROCEDURE — 99215 OFFICE O/P EST HI 40 MIN: CPT

## 2024-05-02 PROCEDURE — 99205 OFFICE O/P NEW HI 60 MIN: CPT

## 2024-05-03 VITALS
HEIGHT: 55 IN | OXYGEN SATURATION: 97 % | DIASTOLIC BLOOD PRESSURE: 97 MMHG | SYSTOLIC BLOOD PRESSURE: 163 MMHG | WEIGHT: 109 LBS | BODY MASS INDEX: 25.22 KG/M2 | RESPIRATION RATE: 17 BRPM | HEART RATE: 96 BPM

## 2024-05-05 LAB
ALBUMIN SERPL ELPH-MCNC: 4.2 G/DL
ALP BLD-CCNC: 211 U/L
ALT SERPL-CCNC: 24 U/L
ANION GAP SERPL CALC-SCNC: 15 MMOL/L
AST SERPL-CCNC: 27 U/L
BASOPHILS # BLD AUTO: 0.04 K/UL
BASOPHILS NFR BLD AUTO: 0.7 %
BILIRUB SERPL-MCNC: 0.4 MG/DL
BUN SERPL-MCNC: 11 MG/DL
CALCIUM SERPL-MCNC: 8.9 MG/DL
CANCER AG125 SERPL-ACNC: 35 U/ML
CEA SERPL-MCNC: 3.5 NG/ML
CHLORIDE SERPL-SCNC: 100 MMOL/L
CO2 SERPL-SCNC: 22 MMOL/L
CREAT SERPL-MCNC: 0.51 MG/DL
EGFR: 95 ML/MIN/1.73M2
EOSINOPHIL # BLD AUTO: 0.1 K/UL
EOSINOPHIL NFR BLD AUTO: 1.7 %
GLUCOSE SERPL-MCNC: 97 MG/DL
HCT VFR BLD CALC: 39.8 %
HGB BLD-MCNC: 13 G/DL
IMM GRANULOCYTES NFR BLD AUTO: 0.3 %
LYMPHOCYTES # BLD AUTO: 1.47 K/UL
LYMPHOCYTES NFR BLD AUTO: 25.4 %
MAN DIFF?: NORMAL
MCHC RBC-ENTMCNC: 27 PG
MCHC RBC-ENTMCNC: 32.7 GM/DL
MCV RBC AUTO: 82.6 FL
MONOCYTES # BLD AUTO: 0.62 K/UL
MONOCYTES NFR BLD AUTO: 10.7 %
NEUTROPHILS # BLD AUTO: 3.54 K/UL
NEUTROPHILS NFR BLD AUTO: 61.2 %
PLATELET # BLD AUTO: 264 K/UL
POTASSIUM SERPL-SCNC: 4.3 MMOL/L
PROT SERPL-MCNC: 7.6 G/DL
RBC # BLD: 4.82 M/UL
RBC # FLD: 17.1 %
SODIUM SERPL-SCNC: 137 MMOL/L
WBC # FLD AUTO: 5.79 K/UL

## 2024-05-06 NOTE — HISTORY OF PRESENT ILLNESS
[de-identified] : Ms. FATMATA GRANT is a 78-year-old woman, referred by Dr. Ana Mariano for consultation regarding a pelvic mass, here for a follow-up visit. Fatmata is primarily Cape Verdean speaking, accompanied by her 3 daughters, Renate Harris & Joselyn, who assist with translation.   Fatmata reports diffuse and severe LUQ pain that started in early 2024, eventually prompting an ED visit to [facility] in April.   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 Menopause 50's. . Age of first full-term pregnancy 24. No OCP/HRT use. last colonoscopy >10 years prior and WNL.  ECOG 1-2  2024 - Fatmata is here for an initial consultation, accompanied by her 3 daughters, Kimberly Dewitt & Joselyn. Fatmata's memory waxes and wanes at times, mostly from the ECT side effects but this also started before her treatments. She reports diffuse/severe LUQ/Left flank abdominal pain that started in early March and eventually prompted a visit to the ED. The pain is also prominent in her pelvic region. Her appetite has been fairly maintained, and she has had ~5 lbs of unintentional weight loss since last month. She denies any nausea, vomiting, diarrhea or vaginal bleeding/cramping. The pain is fairly well managed with Advil.  We discussed the need for further evaluation of the appendiceal and adenexal masses.  Fatmata will undergo bloodwork (including CEA, and Ca125) today.  She will also get a PET CT.  She will see Gyn Oncology, and GI, and begin Prehabilitation.  She will return in 3 weeks.  labs 2024  CEA &  WNL, AlkPhos slightly elevated at 211  EGD & colonoscopy 2024 (Dr. Yeni Zamorano) - Z-line regular, 36 cm from incisors - GE flap valve classified as Hill Grade II (fold present, opens w/ respiration) - gastric mucosal atrophy (bx benign, negative for H pylori & negative for metaplasia)  - 2 sessile polyps in sigmoid colon & cecum, removed (tubular adenoma & hyperplastic polyp, respectively) - submucosal non-obstructing mass found at appendiceal orifice, causing a bulging appendix, non-circumferential  * biopsy shows mildly hyperplastic colonic mucosa, no evidence of mucinous lesion/neoplasm     PET/CT 2024 - nonavid distended appendix, unchanged since 2024 - note that PET is relatively insensitive for mucinous neoplasms - nonavid multiloculated B/L adnexal cystic lesions, unchanged since 2024 - enlarged thyroid gland w/ multiple FDG avid nodules -> f/u U/S to evaluate for signs of malignancy, also TSH level - mildly FDG avid R lung subpleural opacities, likely inflammatory in nature, reassess on F/U - multiple thoracic & lumbar compression fractures.   - indeterminate FDG uptake in soft tissues in the left inguinal region, etiology unclear, query prior inguinal hernia repair   2024 - Fatmata and her daughters return for a follow-up visit to discuss recent imaging and next steps. She also saw Dr. Bridget Medrano from GynOnc yesterday .  Fatmata has seen Orthopedic Spine in the past for the compression fractures.

## 2024-05-06 NOTE — ASSESSMENT
[FreeTextEntry1] : We discussed the planned for a combined robotic possible open appendectomy, and bilateral salpingoopherectomy with Dr. Medrano (Gyn Onc).  We discussed the risks, benefits and alternatives of the procedure.  We also discussed post operative expectations and possible complications.  Fatmata and her daughters  express understanding and agree to proceed.    She will also get a thyroid US to further evaluate the thyroid nodules.  She will also get a repeat CT chest in 4 months.  All medical entries were at my, Dr. Masood Lopez, direction. I have reviewed the chart and agree that the record accurately reflects my personal performance of the history, physical exam, assessment, and plan.  Our office nurse practitioner was present for the duration of the office visit.

## 2024-05-06 NOTE — CONSULT LETTER
[DrJaylene  ___] : Dr. ALMANZA [DrJaylene ___] : Dr. ALMANZA [FreeTextEntry2] : Ana Mariano MD [FreeTextEntry3] : Masood Lopez MD Surgical Oncology [facility] Office: [phone] Cell: [phone]

## 2024-05-10 RX ORDER — NEOMYCIN SULFATE 500 MG/1
500 TABLET ORAL
Qty: 2 | Refills: 0 | Status: ACTIVE | COMMUNITY
Start: 1900-01-01 | End: 1900-01-01

## 2024-05-10 RX ORDER — METRONIDAZOLE 500 MG/1
500 TABLET ORAL
Qty: 2 | Refills: 0 | Status: ACTIVE | COMMUNITY
Start: 1900-01-01 | End: 1900-01-01

## 2024-05-11 ENCOUNTER — APPOINTMENT (OUTPATIENT)
Dept: ULTRASOUND IMAGING | Facility: IMAGING CENTER | Age: 79
End: 2024-05-11
Payer: MEDICARE

## 2024-05-11 ENCOUNTER — OUTPATIENT (OUTPATIENT)
Dept: OUTPATIENT SERVICES | Facility: HOSPITAL | Age: 79
LOS: 1 days | End: 2024-05-11
Payer: MEDICARE

## 2024-05-11 DIAGNOSIS — N94.89 OTHER SPECIFIED CONDITIONS ASSOCIATED WITH FEMALE GENITAL ORGANS AND MENSTRUAL CYCLE: ICD-10-CM

## 2024-05-11 DIAGNOSIS — K38.8 OTHER SPECIFIED DISEASES OF APPENDIX: ICD-10-CM

## 2024-05-11 PROCEDURE — 76536 US EXAM OF HEAD AND NECK: CPT

## 2024-05-11 PROCEDURE — 76536 US EXAM OF HEAD AND NECK: CPT | Mod: 26

## 2024-05-14 ENCOUNTER — APPOINTMENT (OUTPATIENT)
Dept: GASTROENTEROLOGY | Facility: CLINIC | Age: 79
End: 2024-05-14

## 2024-05-14 VITALS
TEMPERATURE: 98 F | SYSTOLIC BLOOD PRESSURE: 150 MMHG | OXYGEN SATURATION: 95 % | HEART RATE: 102 BPM | RESPIRATION RATE: 16 BRPM | DIASTOLIC BLOOD PRESSURE: 74 MMHG

## 2024-05-14 PROCEDURE — 90870 ELECTROCONVULSIVE THERAPY: CPT

## 2024-05-14 NOTE — ECT PRE-PROCEDURE CHECKLIST - AS BP NONINV SITE
right upper arm
right upper arm
left upper arm
right upper arm
left upper arm

## 2024-05-14 NOTE — ECT PRE-PROCEDURE CHECKLIST - PATIENT'S GENDER IDENTITY
Withheld/Decline to Answer
Female
Withheld/Decline to Answer
Withheld/Decline to Answer
Female
Female

## 2024-05-14 NOTE — ECT PRE-PROCEDURE CHECKLIST - PERIPHERAL IV: INSERTION DATE
08-Apr-2024
18-Mar-2024
11-Mar-2024
14-Mar-2024
29-Apr-2024
26-Feb-2024
15-Apr-2024
14-May-2024
09-Feb-2024
04-Mar-2024
26-Mar-2024
21-Feb-2024
16-Feb-2024
14-Feb-2024
02-Apr-2024
07-Mar-2024
28-Feb-2024

## 2024-05-14 NOTE — ECT TREATMENT NOTE - NSECTRECTXSCHED_PSY_ALL_CORE
Weekly
Weekly
Acute Course 2X per week
Weekly
Other
Acute Course 2X per week
Weekly
Acute Course 2X per week
Every 2 weeks
Acute Course 2X per week
Acute Course 2X per week
Every 2 weeks

## 2024-05-14 NOTE — ECT PRE-PROCEDURE CHECKLIST - INV PERIPH IV DEVICE
Angiocath

## 2024-05-14 NOTE — ECT AMBULATORY DISCHARGE PLAN - NSPOSTECTCALLZHH_PSY_ALL_CORE
Call the [facility] ECT suite at [phone]
Call the [facility] ECT suite at [phone]
Call the [facility] ECT suite at [phone]
Call the [facility] ECT suite at [phone]
Call the [facility] ECT suite at [phone]
Call the [facility] ECT suite at [phone]
Call the [facility] ECT suite at [phone]
Call the [facility] ECT suite at [phone]
Call the [facility] ECT suite at [phone]
Call the [facility] ECT suite at [phone]
Call the [facility] ECT suite at [phone]
Call the [facility] ECT suite at [phone]
Call the [facility] ECT suite at [phone]
Call the [facility] ECT suite at [phone]
Call the [facility] ECT suite at [phone]
Call the [facility] ECT suite at [phone]
Call the [facility] ECT suite at [phone]
Call the [facility] ECT suite at [phone]

## 2024-05-14 NOTE — ECT PRE-PROCEDURE CHECKLIST - NSMEDSDOSETAKEN_PSY_ALL_CORE
none
lisinopril 
lisinopril 25mg
lisinopril 
lisinopril 25mg
none
lisinopril 
lisinopril 25mg
lisinopril 

## 2024-05-14 NOTE — ECT PRE-PROCEDURE CHECKLIST - NSPROEDALEARNPREF_GEN_A_NUR
individual instruction/verbal instruction

## 2024-05-14 NOTE — ECT PRE-PROCEDURE CHECKLIST - LAST TOOK
solids
clears
solids
solids
clears
solids
clears
solids
clears
solids

## 2024-05-14 NOTE — ECT PRE-PROCEDURE CHECKLIST - CAREGIVER ADDRESS
[address]

## 2024-05-14 NOTE — ECT PRE-PROCEDURE CHECKLIST - NSPROPTRIGHTSUPPORTPERSON_GEN_A_NUR
same name as above

## 2024-05-14 NOTE — ECT PRE-PROCEDURE CHECKLIST - NSADULTACCOMPNAME_PSY_ALL_CORE
Patrice vora in law
daughter   Kimberly
Millicent Stern
Kimberly   daughter
Millicent Stern
solededad    daughter
Millicent Stern
Kimberly   daughter
daughter   Kimberly
daughter   Kimberly
Millicent Stern
solededad    daughter
solededad    daughter
daughter
daughter
solededad    daughter
daughter   Kimberly
daughter

## 2024-05-14 NOTE — ECT TREATMENT NOTE - NSECTTXELECPLACE_PSY_ALL_CORE
Right Unilateral

## 2024-05-14 NOTE — ECT PRE-PROCEDURE CHECKLIST - AS BP NONINV METHOD
electronic

## 2024-05-14 NOTE — ECT AMBULATORY DISCHARGE PLAN - NSPOSTECTDIET_PSY_ALL_CORE
Gradually resume your regular diet/Increase fluids

## 2024-05-14 NOTE — ECT AMBULATORY DISCHARGE PLAN - NSDPACMPNY_GEN_ALL_CORE
Family

## 2024-05-14 NOTE — ECT AMBULATORY DISCHARGE PLAN - NSPROCPERF_PSY_ALL_CORE
Electroconvulsive Therapy (ECT)

## 2024-05-14 NOTE — ECT AMBULATORY DISCHARGE PLAN - NSPOSTECTPROVEDUCFT_PSY_ALL_CORE
Covid instructions
COVID EDUCATION ,POST ECT INSTRUCTIONS
Covid teaching and D/C instructions 
ECT Instruction 
d/c instructions
ect teaching  covid teaching
written and verbal discharge instructions 
ect teaching  covid teaching
DC summary
Covid instructions
Discharge Instructions Reinforced
ECT Instruction 
d/c instructions
ECT Instruction 
DC summary
DC summary
ECT and COVID teaching 
Post ECT Discharge Instructions

## 2024-05-14 NOTE — ECT PRE-PROCEDURE CHECKLIST - TO WHOM
procedure room RN
procedure room RN
Pre assessment RN to Procedure Room RN
procedure room RN
Pre assessment RN to Procedure Room RN
Pre assessment RN to Procedure Room RN
procedure room RN
Pre assessment RN to Procedure Room RN
Pre assessment RN to Procedure Room RN
procedure room RN
procedure room RN
Pre assessment RN to Procedure Room RN
Pre assessment RN to Procedure Room RN
procedure room RN
procedure room RN

## 2024-05-14 NOTE — ECT PRE-PROCEDURE CHECKLIST - LANGUAGE ASSISTANCE NEEDED
Yes-Patient/Caregiver accepts free interpretation services...
No-Patient/Caregiver offered and refused free interpretation services.
Yes-Patient/Caregiver accepts free interpretation services...
No-Patient/Caregiver offered and refused free interpretation services.
Yes-Patient/Caregiver accepts free interpretation services...

## 2024-05-14 NOTE — ECT TREATMENT NOTE - NSSUICPROTFACT_PSY_ALL_CORE
Responsibility to children, family, or others/Identifies reasons for living

## 2024-05-14 NOTE — ECT AMBULATORY DISCHARGE PLAN - TELEPHONIC ID NUMBER OF THE INTERPRETER
[id number]
[id number]
[id number]
[id number]
[id number]
[id number]
[id number]
[id number]	
[id number]
[id number]
[id number]
[id number]
[id number]
[id number]
[id number]
[id number]
[id number]

## 2024-05-14 NOTE — ECT PRE-PROCEDURE CHECKLIST - SIDE RAILS UP
done
[Negative] : Heme/Lymph
done

## 2024-05-14 NOTE — ECT AMBULATORY DISCHARGE PLAN - NSPOSTECTCASEEMER_PSY_ALL_CORE
In case of any emergency, please go to the nearest emergency room

## 2024-05-14 NOTE — ECT AMBULATORY DISCHARGE PLAN - INTERPRETATION SERVICES DECLINED
Patient/Caregiver requests family/friend to interpret.

## 2024-05-14 NOTE — ECT PRE-PROCEDURE CHECKLIST - CAREGIVER RELATION TO PATIENT
daughter

## 2024-05-14 NOTE — ECT PRE-PROCEDURE CHECKLIST - CAREGIVER PHONE NUMBER
[id number]
[id number]
[id number]
[id number]
[id number]
[id number]
[id number]
[id number]
[id number]
[id number]
[id number]
[id number]
[id number]
[id number]
[id number]
[id number]
[id number]
[id number]

## 2024-05-14 NOTE — ECT PRE-PROCEDURE CHECKLIST - NSPTSENTVIA_PSY_ALL_CORE
ambulate
stretcher
ambulate
stretcher
ambulate

## 2024-05-14 NOTE — ECT PRE-PROCEDURE CHECKLIST - PATIENT'S PREFERRED PRONOUN
Her/She

## 2024-05-14 NOTE — ECT PRE-PROCEDURE CHECKLIST - PATIENT PROBLEMS/NEEDS
Patient expressed no known problems or needs

## 2024-05-14 NOTE — ECT TREATMENT NOTE - NS_RISKASSESSMENTINTER_PSY_ALL_CORE
Low Acute Suicide Risk

## 2024-05-14 NOTE — ECT TREATMENT NOTE - NSECTCHANGEFREQ_PSY_ALL_CORE
No change
No change
Decrease
No change
Decrease
No change

## 2024-05-14 NOTE — ECT TREATMENT NOTE - NSECTCPTCODE_PSY_ALL_CORE
[id number] (ECT-Electroconvulsive Therapy)
69292 (ECT-Electroconvulsive Therapy)
19701 (ECT-Electroconvulsive Therapy)
53549 (ECT-Electroconvulsive Therapy)
62919 (ECT-Electroconvulsive Therapy)
28624 (ECT-Electroconvulsive Therapy)
74302 (ECT-Electroconvulsive Therapy)
22510 (ECT-Electroconvulsive Therapy)
85699 (ECT-Electroconvulsive Therapy)
78020 (ECT-Electroconvulsive Therapy)
61552 (ECT-Electroconvulsive Therapy)
74274 (ECT-Electroconvulsive Therapy)
81114 (ECT-Electroconvulsive Therapy)
04428 (ECT-Electroconvulsive Therapy)
84729 (ECT-Electroconvulsive Therapy)
21234 (ECT-Electroconvulsive Therapy)
15701 (ECT-Electroconvulsive Therapy)
39668 (ECT-Electroconvulsive Therapy)

## 2024-05-14 NOTE — ECT PRE-PROCEDURE CHECKLIST - COMMUNICATION BARRIER
language

## 2024-05-14 NOTE — ECT AMBULATORY DISCHARGE PLAN - NSPOSTECTWORSEPSYCHSX_PSY_ALL_CORE
If you are experiencing heightened or worsening psychological symptoms please contact your private psychiatrist.

## 2024-05-14 NOTE — ECT AMBULATORY DISCHARGE PLAN - NSDCPEFALRISK_GEN_ALL_CORE
For information on Fall & Injury Prevention, visit: https://www.[facility]/news/fall-prevention-protects-and-maintains-health-and-mobility OR  https://www.[facility]/news/fall-prevention-tips-to-avoid-injury OR  https://www.cdc.gov/steadi/patient.html
For information on Fall & Injury Prevention, visit: https://www.[facility]/news/fall-prevention-protects-and-maintains-health-and-mobility OR  https://www.[facility]/news/fall-prevention-tips-to-avoid-injury OR  https://www.cdc.gov/steadi/patient.html
For information on Fall & Injury Prevention, visit: https://www.[facility]/news/fall-prevention-protects-and-maintains-health-and-mobility OR  https://www.[facility]/news/fall-prevention-tips-to-avoid-injury OR  https://www.cdc.gov/steadi/patient.html
For information on Fall & Injury Prevention, visit: https://www.[facility]/news/fall-prevention-protects-and-maintains-health-and-mobility OR  https://www.[facility]/news/fall-prevention-tips-to-avoid-injury OR  https://www.cdc.gov/steadi/patient.html
For information on Fall & Injury Prevention, visit: https://www.[facility]/news/fall-prevention-protects-and-maintains-health-and-mobility OR  https://www.[facility]/news/fall-prevention-tips-to-avoid-injury OR  https://www.cdc.gov/steadi/patient.html
For information on Fall & Injury Prevention, visit: https://www.[facility]/news/fall-prevention-protects-and-maintains-health-and-mobility OR  https://www.[facility]/news/fall-prevention-tips-to-avoid-injury OR  https://www.cdc.gov/steadi/patient.html
For information on Fall & Injury Prevention, visit: https://www.[facility]/news/fall-prevention-protects-and-maintains-health-and-mobility OR  https://www.[facility]/news/fall-prevention-tips-to-avoid-injury OR  https://www.cdc.gov/steadi/patient.html
For information on Fall & Injury Prevention, visit: https://www.[facility]/news/fall-prevention-protects-and-maintains-health-and-mobility OR  https://www.[facility]/news/fall-prevention-tips-to-avoid-injury OR  https://www.cdc.gov/steadi/patient.html
For information on Fall & Injury Prevention, visit: https://www.[facility]/news/fall-prevention-protects-and-maintains-health-and-mobility OR  https://www.[facility]/news/fall-prevention-tips-to-avoid-injury OR  https://www.cdc.gov/steadi/patient.html
For information on Fall & Injury Prevention, visit: https://www.[facility]/news/fall-prevention-protects-and-maintains-health-and-mobility OR  https://www.[facility]/news/fall-prevention-tips-to-avoid-injury OR  https://www.cdc.gov/steadi/patient.html
For information on Fall & Injury Prevention, visit: https://www.[facility]/news/fall-prevention-protects-and-maintains-health-and-mobility OR  https://www.[facility]/news/fall-prevention-tips-to-avoid-injury OR  https://www.cdc.gov/steadi/patient.html
For information on Fall & Injury Prevention, visit: https://www.[facility]/news/fall-prevention-protects-and-maintains-health-and-mobility OR  https://www.[facility]/news/fall-prevention-tips-to-avoid-injury OR  https://www.cdc.gov/steadi/patient.html
For information on Fall & Injury Prevention, visit: https://www.[facility]/news/fall-prevention-protects-and-maintains-health-and-mobility OR  https://www.[facility]/news/fall-prevention-tips-to-avoid-injury OR  https://www.cdc.gov/steadi/patient.html
For information on Fall & Injury Prevention, visit: https://www.[facility]/news/fall-prevention-protects-and-maintains-health-and-mobility OR  https://www.[facility]/news/fall-prevention-tips-to-avoid-injury OR  https://www.cdc.gov/steadi/patient.html
For information on Fall & Injury Prevention, visit: https://www.[facility]/news/fall-prevention-protects-and-maintains-health-and-mobility OR  https://www.[facility]/news/fall-prevention-tips-to-avoid-injury OR  https://www.cdc.gov/steadi/patient.html
For information on Fall & Injury Prevention, visit: https://www.[facility]/news/fall-prevention-protects-and-maintains-health-and-mobility OR  https://www.[facility]/news/fall-prevention-tips-to-avoid-injury OR  https://www.cdc.gov/steadi/patient.html
For information on Fall & Injury Prevention, visit: https://www.[facility]/news/fall-prevention-protects-and-maintains-health-and-mobility OR  https://www.[facility]/news/fall-prevention-tips-to-avoid-injury OR  https://www.cdc.gov/steadi/patient.html
For information on Fall & Injury Prevention, visit: https://www.[facility]/news/fall-prevention-protects-and-maintains-health-and-mobility OR  https://www.[facility]/news/fall-prevention-tips-to-avoid-injury OR  https://www.cdc.gov/steadi/patient.html
For information on Fall & Injury Prevention, visit: https://www.[facility]/news/fall-prevention-protects-and-maintains-health-and-mobility OR  https://www.[facility]/news/fall-prevention-tips-to-avoid-injury OR  https://www.cdc.gov/steadi/patient.html

## 2024-05-14 NOTE — ECT PRE-PROCEDURE CHECKLIST - CAREGIVER NAME
dillan ramon

## 2024-05-14 NOTE — ECT PRE-PROCEDURE CHECKLIST - NSPTSENTTO_PSY_ALL_CORE
holding area
holding area
procedural room
holding area
procedural room
holding area
procedural room
procedural room

## 2024-05-14 NOTE — ECT TREATMENT NOTE - NSECTFOCPECOMPLET_PSY_ALL_CORE
Focused Physical Exam Completed

## 2024-05-14 NOTE — ECT TREATMENT NOTE - NSECTTHYPULSE1ST_PSY_ALL_CORE
0.25 ms

## 2024-05-14 NOTE — ECT PRE-PROCEDURE CHECKLIST - NSADULTACCOMPRELATION_PSY_ALL_CORE
family

## 2024-05-14 NOTE — ECT AMBULATORY DISCHARGE PLAN - NSPOSTECTPOSSCOMP_PSY_ALL_CORE
Headache, nausea, general body aches are common experiences after this treatment.  These may be treated with over-the-counter pain medication.

## 2024-05-14 NOTE — ECT PRE-PROCEDURE CHECKLIST - ALLERGY BAND ON
no known allergies

## 2024-05-14 NOTE — ECT TREATMENT NOTE - NSECTREVSYS_PSY_ALL_CORE
Cardiovascular/Other

## 2024-05-14 NOTE — ECT TREATMENT NOTE - NSECTOTHERCOMMENT_PSY_ALL_CORE
osteoporosis, spinal compression fracture

## 2024-05-14 NOTE — ECT TREATMENT NOTE - NSECTRECTXSCHEDOTHER_PSY_ALL_CORE
In three weeks if cleared by surgery for ECT procedure, or as soon as possible after 3 weeks with clearance.

## 2024-05-14 NOTE — ECT AMBULATORY DISCHARGE PLAN - NSPOSTECTRESTRIC_PSY_ALL_CORE
Drive a car, operate power tools or machinery./Drink alcohol, beer, or wine./Make important personal and business decisions./If you have had any type of sedation, you may experience lightheadedness, dizziness, or sleepiness following your procedure.  A responsible adult should stay with you for at least 24 hours following your procedure.

## 2024-05-14 NOTE — ECT AMBULATORY DISCHARGE PLAN - NSECTPROCEDUREDATE_PSY_ALL_CORE
16-Feb-2024
18-Mar-2024
23-Feb-2024
29-Apr-2024
08-Apr-2024
21-Feb-2024
02-Apr-2024
09-Feb-2024
14-Mar-2024
28-Feb-2024
11-Mar-2024
14-May-2024
29-Apr-2024
04-Mar-2024
26-Mar-2024
15-Apr-2024
26-Feb-2024
07-Mar-2024
14-Feb-2024

## 2024-05-14 NOTE — ECT PRE-PROCEDURE CHECKLIST - NSECTNPODT_PSY_ALL_CORE
14-Apr-2024 19:00
25-Mar-2024 23:00
20-Feb-2024 19:00
22-Feb-2024 20:00
27-Feb-2024 20:00
13-May-2024 21:00
25-Feb-2024 20:00
14-Mar-2024 05:00
07-Mar-2024 00:00
17-Mar-2024 20:30
10-Mar-2024 20:30
29-Apr-2024 06:30
03-Mar-2024 19:00
02-Apr-2024 07:30
07-Apr-2024 22:00
08-Feb-2024 19:00
14-Feb-2024 07:00
15-Feb-2024 18:30

## 2024-05-14 NOTE — ECT TREATMENT NOTE - NSECTCOMMENTS_PSY_ALL_CORE
Patient reported through  #[id number] that she has been doing well through the second two-week interval, denied depression, stable through the entire treatment interval without recurrence of presenting symptoms. Mood, energy, sleep, and appetite were within normal limits. Per daughter the patient becomes frustrated easily and sometimes does not want to go to the adult day program; she was allegedly upset that a peer made a comment about her eating. Patient will have an appendectomy/oophorectomy for a mass seen on imaging, on 5/24/24.     We will require written clearance from the surgeon post-operatively, daughter is aware.

## 2024-05-14 NOTE — ECT TREATMENT NOTE - NSECTPOSTTXSUMMARY_PSY_ALL_CORE
The patient had a well modified grand mal seizure under general anesthesia and muscle relaxant. The patient is alert, responsive, in no acute distress.  Recovery uneventful. 

## 2024-05-14 NOTE — ECT AMBULATORY DISCHARGE PLAN - MODE OF TRANSPORTATION
Wheelchair/Stroller
Ambulance
Wheelchair/Stroller

## 2024-05-14 NOTE — ECT AMBULATORY DISCHARGE PLAN - NSPOSTECTSYMPTOMS_PSY_ALL_CORE
Excessive Diarrhea/Fever/Inability to tolerate liquids or foods/Increased irritability or sluggishness/Nausea and vomiting that does not stop/Pain not relieved by medications/Unable to urinate

## 2024-05-14 NOTE — ECT PRE-PROCEDURE CHECKLIST - PATIENT'S SEXUAL ORIENTATION
Heterosexual

## 2024-05-14 NOTE — ECT AMBULATORY DISCHARGE PLAN - NSPOSTECTCALLBEFORE_PSY_ALL_CORE
[facility] ([facility]) scheduling office at [phone]
[facility] ([facility]) scheduling office at [phone]
[facility] ([facility]) scheduling office at [phone]
[facility] ([facility]) scheduling office at [phone]
[facility] ([facility]) scheduling office at [phone]
[facility] ([facility]) scheduling office at [phone]
[facility] ([facility]) scheduling office at [phone]
[facility] ([facility]) scheduling office at [phone]
[facility] ([facility]) scheduling office at [phone]
[facility] ([facility]) scheduling office at [phone]
[facility] ([facility]) scheduling office at [phone]
[facility] ([facility]) scheduling office at [phone]
[facility] ([facility]) scheduling office at [phone]
[facility] ([facility]) scheduling office at [phone]
[facility] ([facility]) scheduling office at [phone]

## 2024-05-14 NOTE — ECT AMBULATORY DISCHARGE PLAN - NSPOSTECTPTCOND_PSY_ALL_CORE
Stable

## 2024-05-14 NOTE — ECT PRE-PROCEDURE CHECKLIST - NSPROEDAABILITYLEARN_GEN_A_NUR
cognitive limitations/communication limitations
communication limitations
cognitive limitations/communication limitations

## 2024-05-14 NOTE — ECT TREATMENT NOTE - NSICDXBHPRIMARYDX_PSY_ALL_CORE
MDD (major depressive disorder), recurrent episode, severe   F33.2  

## 2024-05-14 NOTE — ECT AMBULATORY DISCHARGE PLAN - INTERPRETER'S NAME
Emmett
Rosa
Karlee
gillian
Luna
paco
Precious
paco
Cece
Emmett
Luna
Rafael
Sanjeev
Cece
Karlee
Luna
Cece

## 2024-05-14 NOTE — ECT AMBULATORY DISCHARGE PLAN - NSPOSTECTCONTPROV_PSY_ALL_CORE
[facility] ([facility]) ECT Suite at [phone]
[facility] ([facility]) ECT Suite at [phone]
[facility] ([facility]) ECT Suite at [phone]
[facility] ([facility]) ECT Suite at [phone]
[facility] ([facility]) ECT Suite at [phone]
[facility] ([facility]) ECT Suite at [phone]
[facility] ([facility]) ECT Suite at [phone]
[facility] ([facility]) ECT Suite at [phone]
[facility] ([facility]) ECT Suite at [phone]
[facility] ([facility]) ECT Suite at [phone]
[facility] ([facility]) ECT Suite at [phone]
[facility] ([facility]) ECT Suite at [phone]
[facility] ([facility]) ECT Suite at [phone]
[facility] ([facility]) ECT Suite at [phone]
[facility] ([facility]) ECT Suite at [phone]
[facility] ([facility]) ECT Suite at [phone]

## 2024-05-14 NOTE — ECT PRE-PROCEDURE CHECKLIST - NSPROEDAREADYLEARN_GEN_A_NUR
depression

## 2024-05-14 NOTE — ECT PRE-PROCEDURE CHECKLIST - NSADULTACCOMPPHNUMBER_PSY_ALL_CORE
[id number]
[id number]
[id number]
[phone]
[phone]
[phone]
[phone]
[phone]
[phone]
[phone]
[phone]
[phone]

## 2024-05-14 NOTE — ECT AMBULATORY DISCHARGE PLAN - NSPOSTECTSMOKING_PSY_ALL_CORE
If you are a smoker, it is important for your health to stop smoking.  Please be aware that second hand smoke is also harmful.

## 2024-05-14 NOTE — ECT AMBULATORY DISCHARGE PLAN - PATIENT PORTAL LINK FT
You can access the FollowMyHealth Patient Portal offered by [facility] by registering at the following website: [email or web address]. By joining [employer]’s FollowMyHealth portal, you will also be able to view your health information using other applications (apps) compatible with our system.
You can access the FollowMyHealth Patient Portal offered by [facility] by registering at the following website: [email or web address]. By joining [employer]’s FollowMyHealth portal, you will also be able to view your health information using other applications (apps) compatible with our system.
You can access the FollowMyHealth Patient Portal offered by [facility] by registering at the following website: [email or web address]. By joining [employer]’s FollowMyHealth portal, you will also be able to view your health information using other applications (apps) compatible with our system.
You can access the FollowMyHealth Patient Portal offered by [facility] by registering at the following website: [email or web address]. By joining [employer]’s FollowMyHealth portal, you will also be able to view your health information using other applications (apps) compatible with our system.
You can access the FollowMyHealth Patient Portal offered by [facility] by registering at the following website: [email or web address]. By joining [employer]’s FollowMyHealth portal, you will also be able to view your health information using other applications (apps) compatible with our system.
You can access the FollowMyHealth Patient Portal offered by [facility] by registering at the following website: [email or web address]. By joining [employer]’s FollowMyHealth portal, you will also be able to view your health information using other applications (apps) compatible with our system.
You can access the FollowMyHealth Patient Portal offered by [facility] by registering at the following website: [email or web address]. By joining [employer]’s FollowMyHealth portal, you will also be able to view your health information using other applications (apps) compatible with our system.
You can access the FollowMyHealth Patient Portal offered by [facility] by registering at the following website: [email or web address]. By joining [employer]’s FollowMyHealth portal, you will also be able to view your health information using other applications (apps) compatible with our system.
You can access the FollowMyHealth Patient Portal offered by [facility] by registering at the following website: [email or web address]. By joining [employer]’s FollowMyHealth portal, you will also be able to view your health information using other applications (apps) compatible with our system.
You can access the FollowMyHealth Patient Portal offered by [facility] by registering at the following website: [email or web address]. By joining [employer]’s FollowMyHealth portal, you will also be able to view your health information using other applications (apps) compatible with our system.
You can access the FollowMyHealth Patient Portal offered by [facility] by registering at the following website: [email or web address]. By joining [employer]’s FollowMyHealth portal, you will also be able to view your health information using other applications (apps) compatible with our system.
You can access the FollowMyHealth Patient Portal offered by [facility] by registering at the following website: [email or web address]. By joining [employer]’s FollowMyHealth portal, you will also be able to view your health information using other applications (apps) compatible with our system.
You can access the FollowMyHealth Patient Portal offered by [facility] by registering at the following website: [email or web address]. By joining [employer]’s FollowMyHealth portal, you will also be able to view your health information using other applications (apps) compatible with our system.
You can access the FollowMyHealth Patient Portal offered by [facility] by registering at the following website: [email or web address]. By joining [employer]’s FollowMyHealth portal, you will also be able to view your health information using other applications (apps) compatible with our system.
You can access the FollowMyHealth Patient Portal offered by [facility] by registering at the following website: [email or web address]. By joining [employer]’s FollowMyHealth portal, you will also be able to view your health information using other applications (apps) compatible with our system.
You can access the FollowMyHealth Patient Portal offered by [facility] by registering at the following website: [email or web address]. By joining [employer]’s FollowMyHealth portal, you will also be able to view your health information using other applications (apps) compatible with our system.
You can access the FollowMyHealth Patient Portal offered by [facility] by registering at the following website: [email or web address]. By joining [email or web address]’s FollowMyHealth portal, you will also be able to view your health information using other applications (apps) compatible with our system.
You can access the FollowMyHealth Patient Portal offered by [facility] by registering at the following website: [email or web address]. By joining [employer]’s FollowMyHealth portal, you will also be able to view your health information using other applications (apps) compatible with our system.
You can access the FollowMyHealth Patient Portal offered by [facility] by registering at the following website: [email or web address]. By joining [employer]’s FollowMyHealth portal, you will also be able to view your health information using other applications (apps) compatible with our system.

## 2024-05-14 NOTE — ECT PRE-PROCEDURE CHECKLIST - NSPROEDALEARNER_GEN_A_NUR
family

## 2024-05-14 NOTE — ECT TREATMENT NOTE - NSECTTXPERFDATETIME_PSY_ALL_CORE
26-Feb-2024 09:23
11-Mar-2024 08:51
18-Mar-2024 08:49
14-May-2024 09:26
08-Apr-2024 10:46
02-Apr-2024 11:03
16-Feb-2024 09:37
09-Feb-2024 09:45
23-Feb-2024 10:06
15-Apr-2024 09:06
29-Apr-2024 09:46
07-Mar-2024 14:40
04-Mar-2024 09:25
26-Mar-2024 08:44
14-Mar-2024 15:02
21-Feb-2024 10:28
14-Feb-2024 10:48
28-Feb-2024 10:50

## 2024-05-14 NOTE — ECT AMBULATORY DISCHARGE PLAN - PRO INTERPRETER NEED 2
French
German
Samoan
Central African
Jordanian
[place] citizen
Icelandic
Liberian
Prydeinig
Greenlandic
Taiwanese
Uzbek
Ugandan
Dominican
Nigerian
German
Lao
Guyanese
Citizen of [place]

## 2024-05-14 NOTE — ECT AMBULATORY DISCHARGE PLAN - NSPOSTECTNEXTSCHTXDT_PSY_ALL_CORE
04-Jun-2024 10:15
14-Mar-2024 14:00
18-Mar-2024 08:00
14-May-2024 08:30
16-Feb-2024 08:30
28-Feb-2024 09:45
26-Feb-2024 08:30
08-Mar-2024 08:30
14-Feb-2024 09:45
02-Apr-2024 09:45
11-Mar-2024 09:30
23-Feb-2024 09:00
15-Apr-2024 08:30
21-Feb-2024 09:45
08-Apr-2024 09:15
04-Mar-2024 08:30
26-Mar-2024 08:15
29-Apr-2024 08:30

## 2024-05-14 NOTE — ECT PRE-PROCEDURE CHECKLIST - HOW PATIENT ADDRESSED, PROFILE
Fatmata

## 2024-05-14 NOTE — ECT PRE-PROCEDURE CHECKLIST - PRO INTERPRETER NEED 2
Puerto Rican
Malian
Prydeinig
Zambian
Qatari
Cayman Islander
Citizen of [place]
Barbadian
Moroccan
Andorran
Gabonese
New Zealander
Mozambican
Cook Islander
Gambian
Japanese
Austrian
Malian

## 2024-05-21 ENCOUNTER — OUTPATIENT (OUTPATIENT)
Dept: OUTPATIENT SERVICES | Facility: HOSPITAL | Age: 79
LOS: 1 days | End: 2024-05-21

## 2024-05-21 VITALS
RESPIRATION RATE: 16 BRPM | WEIGHT: 108.91 LBS | SYSTOLIC BLOOD PRESSURE: 154 MMHG | OXYGEN SATURATION: 98 % | TEMPERATURE: 98 F | DIASTOLIC BLOOD PRESSURE: 72 MMHG | HEIGHT: 56 IN | HEART RATE: 80 BPM

## 2024-05-21 DIAGNOSIS — F41.9 ANXIETY DISORDER, UNSPECIFIED: ICD-10-CM

## 2024-05-21 DIAGNOSIS — N94.89 OTHER SPECIFIED CONDITIONS ASSOCIATED WITH FEMALE GENITAL ORGANS AND MENSTRUAL CYCLE: ICD-10-CM

## 2024-05-21 DIAGNOSIS — I10 ESSENTIAL (PRIMARY) HYPERTENSION: ICD-10-CM

## 2024-05-21 DIAGNOSIS — Z98.890 OTHER SPECIFIED POSTPROCEDURAL STATES: Chronic | ICD-10-CM

## 2024-05-21 DIAGNOSIS — K38.8 OTHER SPECIFIED DISEASES OF APPENDIX: ICD-10-CM

## 2024-05-21 LAB
A1C WITH ESTIMATED AVERAGE GLUCOSE RESULT: 5.4 % — SIGNIFICANT CHANGE UP (ref 4–5.6)
BLD GP AB SCN SERPL QL: NEGATIVE — SIGNIFICANT CHANGE UP
ESTIMATED AVERAGE GLUCOSE: 108 — SIGNIFICANT CHANGE UP
RH IG SCN BLD-IMP: POSITIVE — SIGNIFICANT CHANGE UP
RH IG SCN BLD-IMP: POSITIVE — SIGNIFICANT CHANGE UP

## 2024-05-21 RX ORDER — QUETIAPINE FUMARATE 200 MG/1
1 TABLET, FILM COATED ORAL
Refills: 0 | DISCHARGE

## 2024-05-21 RX ORDER — SODIUM CHLORIDE 9 MG/ML
1000 INJECTION, SOLUTION INTRAVENOUS
Refills: 0 | Status: DISCONTINUED | OUTPATIENT
Start: 2024-05-24 | End: 2024-05-24

## 2024-05-21 RX ORDER — ATORVASTATIN CALCIUM 80 MG/1
0 TABLET, FILM COATED ORAL
Refills: 0 | DISCHARGE

## 2024-05-21 RX ORDER — SERTRALINE 25 MG/1
2 TABLET, FILM COATED ORAL
Refills: 0 | DISCHARGE

## 2024-05-21 RX ORDER — LISINOPRIL 2.5 MG/1
1 TABLET ORAL
Refills: 0 | DISCHARGE

## 2024-05-21 RX ORDER — QUETIAPINE FUMARATE 200 MG/1
0 TABLET, FILM COATED ORAL
Refills: 0 | DISCHARGE

## 2024-05-21 RX ORDER — AMLODIPINE BESYLATE 2.5 MG/1
1 TABLET ORAL
Refills: 0 | DISCHARGE

## 2024-05-21 NOTE — H&P PST ADULT - PROBLEM SELECTOR PLAN 1
Patient tentatively scheduled for robotic possible open resection of abdominal mass. Dr. Medrano : robotic assisted bilateral salpingo oophorectomy , possible total abdominal hysterectomy , staging on 05/24/2024.  Pre-op instructions provided. Pt given verbal and written instructions with teach back on chlorhexidine wash  and pepcid. Pt verbalized understanding with return demonstration.  cbc, cmp done by surgeon on 04/11/24 - copy of results in chart.  Hba1c , t/s, abo done.- results pending.

## 2024-05-21 NOTE — H&P PST ADULT - GENITOURINARY COMMENTS
Pre op dx- Adnexal mass c/o LUQ/Left flank abdominal pain that started in early March and eventually prompted a visit to the ED. The pain is also prominent in her pelvic region . lost 5 lbs Pt 's daughter states , pt was c/o LUQ/Left flank abdominal pain , pelvic pain since early March and eventually prompted a visit to the ED.Pt lost 5 lbs . 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

## 2024-05-21 NOTE — H&P PST ADULT - HISTORY OF PRESENT ILLNESS
Fatmata reports diffuse and severe LUQ pain that started in early March 2024, eventually prompting an ED visit to [facility] in April.  ? 78 year old Albanian speaking female with hx of HLD, HTN, osteoporosis, severe depression - tx w/ ECT weekly (then biweekly starting 4/15), anxiety, panic disorder c/o diffuse, severe LUQ and pelvic pain since early March 2024,went to  [facility] 04/24, CT A/P 4/1/2024 showed appendix mucocele containing an area of nodular wall thickening - suspicious for carcinoma and b/l  adnexal multiloculated cystic lesions, suspicious for ovarian mets rather than primary malignancy.   Pt presents to PST today with pre op dx of adnexal mass is scheduled for robotic possible open resection of abdominal mass. Dr. Medrano : robotic assisted bilateral salpingo oophorectomy , possible total abdominal hysterectomy , staging.  78 year old Welsh speaking female with hx of HLD, HTN, osteoporosis, spinal compression fracture, severe depression - tx w/ ECT weekly (then biweekly starting 4/15), anxiety, panic disorder c/o diffuse, severe LUQ and pelvic pain since early March 2024,went to  [facility] 04/24, CT A/P 4/1/2024 showed appendix mucocele containing an area of nodular wall thickening - suspicious for carcinoma and b/l  adnexal multiloculated cystic lesions, suspicious for ovarian mets rather than primary malignancy.   Pt presents to PST today with pre op dx of adnexal mass is scheduled for robotic possible open resection of abdominal mass. Dr. Medrano : robotic assisted bilateral salpingo oophorectomy , possible total abdominal hysterectomy , staging.

## 2024-05-21 NOTE — H&P PST ADULT - PROBLEM SELECTOR PLAN 2
Patient instructed to take lisinopril with a sip of water on the morning of procedure. Patient instructed to take lisinopril with a sip of water on the morning of procedure.  Requesting cardiologist for recent EKG and echo report.

## 2024-05-21 NOTE — H&P PST ADULT - NSICDXPASTMEDICALHX_GEN_ALL_CORE_FT
PAST MEDICAL HISTORY:  Adnexal mass     Anxiety     Appendicular mucocele     Depression     HLD (hyperlipidemia)     HTN (hypertension)     Prediabetes

## 2024-05-21 NOTE — H&P PST ADULT - ATTENDING COMMENTS
D/w pt plan for robotic poss open appendectomy for mucocele w nodularity and BSO w Dr. Medrano    Discussed r/b/a post op expectations poss complications.      Pt understands and agrees to proceed.

## 2024-05-21 NOTE — H&P PST ADULT - MUSCULOSKELETAL
ROM intact/no calf tenderness/normal gait/strength 5/5 bilateral upper extremities/strength 5/5 bilateral lower extremities/back exam/extremities exam details…

## 2024-05-23 ENCOUNTER — TRANSCRIPTION ENCOUNTER (OUTPATIENT)
Age: 79
End: 2024-05-23

## 2024-05-23 NOTE — ASU PATIENT PROFILE, ADULT - FALL HARM RISK - UNIVERSAL INTERVENTIONS
Bed in lowest position, wheels locked, appropriate side rails in place/Call bell, personal items and telephone in reach/Instruct patient to call for assistance before getting out of bed or chair/Non-slip footwear when patient is out of bed/[place] to call system/Physically safe environment - no spills, clutter or unnecessary equipment/Purposeful Proactive Rounding/Room/bathroom lighting operational, light cord in reach

## 2024-05-23 NOTE — ASU PATIENT PROFILE, ADULT - NS PRO ABUSE SCREEN AFRAID ANYONE YN
Problem: Self Care Deficits Care Plan (Adult)  Goal: *Acute Goals and Plan of Care (Insert Text)  Description: FUNCTIONAL STATUS PRIOR TO ADMISSION: Patient was active without use of DME, supervised by daughter with self-care tasks d/t to memory loss    HOME SUPPORT: The patient lived with daughter who works from home. Occupational Therapy Goals  Initiated 8/13/2022  1. Patient will perform grooming stadnding at sink with modified independence within 7 day(s). 2.  Patient will perform bathing with supervision/set-up within 7 day(s). 3.  Patient will perform lower body dressing with modified independence within 7 day(s). 4.  Patient will perform toilet transfers with supervision/set-up within 7 day(s). 5.  Patient will perform all aspects of toileting with supervision/set-up within 7 day(s). 6.  Patient will participate in upper extremity therapeutic exercise/activities with supervision/set-up for 10 minutes within 7 day(s). 7.  Patient will utilize energy conservation techniques during functional activities with verbal cues within 7 day(s). Outcome: Progressing Towards Goal   OCCUPATIONAL THERAPY EVALUATION  Patient: Renaldo Freed (79 y.o. male)  Date: 8/13/2022  Primary Diagnosis: YAYA (acute kidney injury) ([facility] [address] [N17.9]  Nephrolithiasis [N20.0]  Procedure(s) (LRB):  CYSTOSCOPY, RIGHT RETROGRADE PYELOGRAM, RIGHT URETERAL STENT PLACEMENT (Right) 1 Day Post-Op   Precautions: Falls       ASSESSMENT  Based on the objective data described below, the patient presents with impaired balance, confusion, generalized weakness, impaired activity tolerance and decrease independence with self-care. Supportive daughter present in the room. Pt less agitated compared to yesterday's attempted eval session that was aborted. Pt presents at min assist x 2 with mobility and min assist with self-care tasks.   PTA, pt was able to complete ADLs with occasional supervision from daughter and was ambulating around house without AD. Pt is below his baseline. Pt would benefit from skilled OT intervention and recommend HHOT and 24/7 supervision at discharge. Will con't to follow. Current Level of Function Impacting Discharge (ADLs/self-care): min assist x 2    Other factors to consider for discharge: memory loss     Patient will benefit from skilled therapy intervention to address the above noted impairments. PLAN :  Recommendations and Planned Interventions: self care training, functional mobility training, therapeutic exercise, balance training, therapeutic activities, endurance activities, patient education, home safety training, and family training/education    Frequency/Duration: Patient will be followed by occupational therapy 4 times a week to address goals. Recommendation for discharge: (in order for the patient to meet his/her long term goals)  Occupational therapy at least 2 days/week in the home     This discharge recommendation:  A follow-up discussion with the attending provider and/or case management is planned    IF patient discharges home will need the following DME: RW? SUBJECTIVE:   Patient stated I feel like I have to pee.     OBJECTIVE DATA SUMMARY:   HISTORY:   Past Medical History:   Diagnosis Date    Blindness     Glaucoma     HTN (hypertension)     MI (myocardial infarction) ([facility] [address]      Past Surgical History:   Procedure Laterality Date    HX MITRAL VALVE REPLACEMENT         Expanded or extensive additional review of patient history:     Home Situation  Home Environment: Private residence  # Steps to Enter: 4  Rails to Enter: Yes  One/Two Story Residence: Two story, live on 1st floor  Living Alone: No  Support Systems: Child(memo) (lives with daughter who works from home)  Patient Expects to be Discharged to[de-identified] Home with family assistance  Current DME Used/Available at Home: Shower chair  Tub or Shower Type: Shower      EXAMINATION OF PERFORMANCE DEFICITS:  Cognitive/Behavioral Status:  Neurologic State: Alert  Orientation Level: Oriented to person  Cognition: Follows commands;Memory loss; Other (comment) (Increases time to process questions)        Safety/Judgement: Decreased insight into deficits    Skin: impaired, distal penis    Edema: none noted    Hearing:       Vision/Perceptual:            Blind R eye, glaucoma, sensitive to light                         Range of Motion:    AROM: Generally decreased, functional                         Strength:    Strength: Generally decreased, functional                Coordination:  Coordination: Generally decreased, functional  Fine Motor Skills-Upper: Left Intact; Right Intact    Gross Motor Skills-Upper: Left Intact; Right Intact    Tone & Sensation:    Tone: Normal  Sensation: Intact                      Balance:  Sitting: Impaired  Sitting - Static: Good (unsupported)  Sitting - Dynamic: Fair (occasional)  Standing: Impaired; With support (HHA)  Standing - Static: Fair;Constant support  Standing - Dynamic : Fair;Constant support    Functional Mobility and Transfers for ADLs:  Bed Mobility:  Supine to Sit: Minimum assistance; Additional time;Bed Modified  Scooting: Contact guard assistance;Minimum assistance (scoot to EOB)    Transfers:  Sit to Stand: Contact guard assistance;Assist x2  Stand to Sit: Contact guard assistance;Assist x2  Bed to Chair: Minimum assistance;Assist x2 (SPT with HHA)    ADL Assessment:  Feeding: Setup    Oral Facial Hygiene/Grooming: Minimum assistance    Bathing: Minimum assistance         Upper Body Dressing: Minimum assistance    Lower Body Dressing: Minimum assistance    Toileting: Minimum assistance                  ADL Intervention and task modifications:         Cognitive Retraining  Safety/Judgement: Decreased insight into deficits        Pain Rating:  Pain noted, but no verbal score given    Activity Tolerance:   Fair    After treatment patient left in no apparent distress:    Sitting in chair, Call bell within reach, Bed / chair alarm activated, and Caregiver / family present    COMMUNICATION/EDUCATION:   The patients plan of care was discussed with: Physical therapist and Registered nurse. Home safety education was provided and the patient/caregiver indicated understanding., Patient/family have participated as able in goal setting and plan of care. , and Patient/family agree to work toward stated goals and plan of care. This patients plan of care is appropriate for delegation to \[facility] [place] [facility]\"".     Thank you for this referral.  Leopold Crandall, OTR/L  Time Calculation: 42 mins no

## 2024-05-24 ENCOUNTER — INPATIENT (INPATIENT)
Facility: HOSPITAL | Age: 79
LOS: 1 days | Discharge: HOME | End: 2024-05-26
Attending: SURGERY | Admitting: SURGERY
Payer: MEDICARE

## 2024-05-24 ENCOUNTER — APPOINTMENT (OUTPATIENT)
Dept: GYNECOLOGIC ONCOLOGY | Facility: HOSPITAL | Age: 79
End: 2024-05-24

## 2024-05-24 ENCOUNTER — APPOINTMENT (OUTPATIENT)
Dept: SURGICAL ONCOLOGY | Facility: HOSPITAL | Age: 79
End: 2024-05-24

## 2024-05-24 ENCOUNTER — RESULT REVIEW (OUTPATIENT)
Age: 79
End: 2024-05-24

## 2024-05-24 VITALS
DIASTOLIC BLOOD PRESSURE: 72 MMHG | HEART RATE: 108 BPM | SYSTOLIC BLOOD PRESSURE: 152 MMHG | OXYGEN SATURATION: 97 % | TEMPERATURE: 98 F | RESPIRATION RATE: 17 BRPM | WEIGHT: 108.91 LBS | HEIGHT: 56 IN

## 2024-05-24 DIAGNOSIS — K38.8 OTHER SPECIFIED DISEASES OF APPENDIX: ICD-10-CM

## 2024-05-24 DIAGNOSIS — Z98.890 OTHER SPECIFIED POSTPROCEDURAL STATES: Chronic | ICD-10-CM

## 2024-05-24 LAB
ANION GAP SERPL CALC-SCNC: 15 MMOL/L — HIGH (ref 7–14)
BUN SERPL-MCNC: 6 MG/DL — LOW (ref 7–23)
CALCIUM SERPL-MCNC: 7.7 MG/DL — LOW (ref 8.4–10.5)
CHLORIDE SERPL-SCNC: 99 MMOL/L — SIGNIFICANT CHANGE UP (ref 98–107)
CO2 SERPL-SCNC: 19 MMOL/L — LOW (ref 22–31)
CREAT SERPL-MCNC: 0.38 MG/DL — LOW (ref 0.5–1.3)
EGFR: 102 ML/MIN/1.73M2 — SIGNIFICANT CHANGE UP
GLUCOSE BLDC GLUCOMTR-MCNC: 119 MG/DL — HIGH (ref 70–99)
GLUCOSE SERPL-MCNC: 133 MG/DL — HIGH (ref 70–99)
HCT VFR BLD CALC: 38.4 % — SIGNIFICANT CHANGE UP (ref 34.5–45)
HGB BLD-MCNC: 12.8 G/DL — SIGNIFICANT CHANGE UP (ref 11.5–15.5)
MAGNESIUM SERPL-MCNC: 1.9 MG/DL — SIGNIFICANT CHANGE UP (ref 1.6–2.6)
MCHC RBC-ENTMCNC: 26.9 PG — LOW (ref 27–34)
MCHC RBC-ENTMCNC: 33.3 GM/DL — SIGNIFICANT CHANGE UP (ref 32–36)
MCV RBC AUTO: 80.7 FL — SIGNIFICANT CHANGE UP (ref 80–100)
NRBC # BLD: 0 /100 WBCS — SIGNIFICANT CHANGE UP (ref 0–0)
NRBC # FLD: 0 K/UL — SIGNIFICANT CHANGE UP (ref 0–0)
PHOSPHATE SERPL-MCNC: 2.4 MG/DL — LOW (ref 2.5–4.5)
PLATELET # BLD AUTO: 243 K/UL — SIGNIFICANT CHANGE UP (ref 150–400)
POTASSIUM SERPL-MCNC: 3.8 MMOL/L — SIGNIFICANT CHANGE UP (ref 3.5–5.3)
POTASSIUM SERPL-SCNC: 3.8 MMOL/L — SIGNIFICANT CHANGE UP (ref 3.5–5.3)
RBC # BLD: 4.76 M/UL — SIGNIFICANT CHANGE UP (ref 3.8–5.2)
RBC # FLD: 15.6 % — HIGH (ref 10.3–14.5)
SODIUM SERPL-SCNC: 133 MMOL/L — LOW (ref 135–145)
WBC # BLD: 12.04 K/UL — HIGH (ref 3.8–10.5)
WBC # FLD AUTO: 12.04 K/UL — HIGH (ref 3.8–10.5)

## 2024-05-24 PROCEDURE — 88305 TISSUE EXAM BY PATHOLOGIST: CPT | Mod: 26

## 2024-05-24 PROCEDURE — 58661 LAPAROSCOPY REMOVE ADNEXA: CPT | Mod: 50

## 2024-05-24 PROCEDURE — 88342 IMHCHEM/IMCYTCHM 1ST ANTB: CPT | Mod: 26

## 2024-05-24 PROCEDURE — 88304 TISSUE EXAM BY PATHOLOGIST: CPT | Mod: 26

## 2024-05-24 PROCEDURE — S2900 ROBOTIC SURGICAL SYSTEM: CPT | Mod: NC

## 2024-05-24 PROCEDURE — 44970 LAPAROSCOPY APPENDECTOMY: CPT

## 2024-05-24 PROCEDURE — 88307 TISSUE EXAM BY PATHOLOGIST: CPT | Mod: 26

## 2024-05-24 DEVICE — XI STAPLER SUREFORM RELOAD 45 BLUE: Type: IMPLANTABLE DEVICE | Site: BILATERAL | Status: FUNCTIONAL

## 2024-05-24 DEVICE — LIGATING CLIPS WECK HEMOLOK POLYMER LARGE (PURPLE) 6: Type: IMPLANTABLE DEVICE | Site: BILATERAL | Status: FUNCTIONAL

## 2024-05-24 RX ORDER — FENTANYL CITRATE 50 UG/ML
50 INJECTION INTRAVENOUS
Refills: 0 | Status: DISCONTINUED | OUTPATIENT
Start: 2024-05-24 | End: 2024-05-24

## 2024-05-24 RX ORDER — ACETAMINOPHEN 500 MG
975 TABLET ORAL EVERY 6 HOURS
Refills: 0 | Status: DISCONTINUED | OUTPATIENT
Start: 2024-05-24 | End: 2024-05-26

## 2024-05-24 RX ORDER — BUPROPION HYDROCHLORIDE 150 MG/1
150 TABLET, EXTENDED RELEASE ORAL DAILY
Refills: 0 | Status: DISCONTINUED | OUTPATIENT
Start: 2024-05-24 | End: 2024-05-26

## 2024-05-24 RX ORDER — CHLORHEXIDINE GLUCONATE 213 G/1000ML
1 SOLUTION TOPICAL ONCE
Refills: 0 | Status: COMPLETED | OUTPATIENT
Start: 2024-05-24 | End: 2024-05-24

## 2024-05-24 RX ORDER — ENOXAPARIN SODIUM 100 MG/ML
40 INJECTION SUBCUTANEOUS EVERY 24 HOURS
Refills: 0 | Status: DISCONTINUED | OUTPATIENT
Start: 2024-05-24 | End: 2024-05-26

## 2024-05-24 RX ORDER — OXYCODONE HYDROCHLORIDE 5 MG/1
5 TABLET ORAL ONCE
Refills: 0 | Status: DISCONTINUED | OUTPATIENT
Start: 2024-05-24 | End: 2024-05-24

## 2024-05-24 RX ORDER — SERTRALINE 25 MG/1
200 TABLET, FILM COATED ORAL AT BEDTIME
Refills: 0 | Status: DISCONTINUED | OUTPATIENT
Start: 2024-05-24 | End: 2024-05-26

## 2024-05-24 RX ORDER — HYDROMORPHONE HYDROCHLORIDE 2 MG/ML
0.5 INJECTION INTRAMUSCULAR; INTRAVENOUS; SUBCUTANEOUS
Refills: 0 | Status: DISCONTINUED | OUTPATIENT
Start: 2024-05-24 | End: 2024-05-24

## 2024-05-24 RX ORDER — LISINOPRIL 2.5 MG/1
20 TABLET ORAL DAILY
Refills: 0 | Status: DISCONTINUED | OUTPATIENT
Start: 2024-05-24 | End: 2024-05-26

## 2024-05-24 RX ORDER — ONDANSETRON 8 MG/1
4 TABLET, FILM COATED ORAL ONCE
Refills: 0 | Status: DISCONTINUED | OUTPATIENT
Start: 2024-05-24 | End: 2024-05-24

## 2024-05-24 RX ORDER — OXYCODONE HYDROCHLORIDE 5 MG/1
2.5 TABLET ORAL EVERY 4 HOURS
Refills: 0 | Status: DISCONTINUED | OUTPATIENT
Start: 2024-05-24 | End: 2024-05-26

## 2024-05-24 RX ORDER — QUETIAPINE FUMARATE 200 MG/1
25 TABLET, FILM COATED ORAL AT BEDTIME
Refills: 0 | Status: DISCONTINUED | OUTPATIENT
Start: 2024-05-24 | End: 2024-05-26

## 2024-05-24 RX ORDER — SODIUM CHLORIDE 9 MG/ML
1000 INJECTION, SOLUTION INTRAVENOUS
Refills: 0 | Status: DISCONTINUED | OUTPATIENT
Start: 2024-05-24 | End: 2024-05-24

## 2024-05-24 RX ADMIN — OXYCODONE HYDROCHLORIDE 5 MILLIGRAM(S): 5 TABLET ORAL at 18:00

## 2024-05-24 RX ADMIN — SODIUM CHLORIDE 30 MILLILITER(S): 9 INJECTION, SOLUTION INTRAVENOUS at 11:50

## 2024-05-24 RX ADMIN — QUETIAPINE FUMARATE 25 MILLIGRAM(S): 200 TABLET, FILM COATED ORAL at 22:01

## 2024-05-24 RX ADMIN — SERTRALINE 200 MILLIGRAM(S): 25 TABLET, FILM COATED ORAL at 22:01

## 2024-05-24 RX ADMIN — CHLORHEXIDINE GLUCONATE 1 APPLICATION(S): 213 SOLUTION TOPICAL at 11:51

## 2024-05-24 RX ADMIN — OXYCODONE HYDROCHLORIDE 5 MILLIGRAM(S): 5 TABLET ORAL at 17:17

## 2024-05-24 NOTE — BRIEF OPERATIVE NOTE - NSICDXBRIEFPREOP_GEN_ALL_CORE_FT
PRE-OP DIAGNOSIS:  Bilateral ovarian cysts 24-May-2024 15:49:04  Gina Viera  
PRE-OP DIAGNOSIS:  Abdominal mass 24-May-2024 14:16:11  Carlton JACKSON

## 2024-05-24 NOTE — BRIEF OPERATIVE NOTE - NSICDXBRIEFPOSTOP_GEN_ALL_CORE_FT
POST-OP DIAGNOSIS:  Bilateral ovarian cysts 24-May-2024 15:49:36  Gina Viera  
POST-OP DIAGNOSIS:  Low grade mucinous neoplasm of appendix 24-May-2024 14:17:05  Carlton JACKSON

## 2024-05-24 NOTE — PROVIDER CONTACT NOTE (OTHER) - ASSESSMENT
Patient is a&ox4. IV flushed and patent. Denies chest pain and shortness of breath. Bright red blood on pad

## 2024-05-24 NOTE — CHART NOTE - NSCHARTNOTEFT_GEN_A_CORE
Patient Care Team:  Bruno Ricci MD as PCP - General (Internal Medicine)    Chief complaint:  Shortness of breath    History of present illness:  Subjective   This is a 62-year-old male patient who presented to the ED today for shortness of breath.  He was noted to have bilateral pulmonary infiltrates.  He was in respiratory distress and required 15 L O2 by nasal cannula and despite that he remained hypoxic and had signs of respiratory failure and therefore he was intubated.    He has a history of CAD s/p CABG Nov @ [facility].    Dyspnea apparently occurred suddenly about 2 hours prior to presentation.  Blood pressure in the ED was 210/132 BP then dropped after intubation and sedation requiring intravenous pressors with Levophed.  In addition, patient has already received Lasix 40 mg IV in the ER.    On my evaluation of the patient, he was sedated with Levophed and not responsive to verbal stimulus.  No family at bedside to provide with history.  Most of the information was obtained from the chart and after discussion with the staff and ED provider.    Lasix 40 mg IV x1 administered in the ED.  Echo 2/9/2021: EF normal; severely increased LA size; moderate severe MR.  Mild AR.    Labs reviewed:  ABG 7.19/66/81 100% FiO2 with rate of 22 then 7.26/51/108 on 100% with rate of 28.  CMP normal; WBC 15.6; hemoglobin 16    Review of Systems:  Cannot obtain due to mechanical ventilation and sedation    History  Past Medical History:   Diagnosis Date   • Arthritis    • Claustrophobia    • Coronary artery disease    • Herniated intervertebral disc of lumbar spine    • Hyperlipidemia    • Low back pain      Past Surgical History:   Procedure Laterality Date   • CORONARY ARTERY BYPASS GRAFT     • GANGLION CYST EXCISION Right     ANKLE   • KNEE SURGERY Right 2013   • LUMBAR EPIDURAL INJECTION     • LUMBAR LAMINECTOMY DISCECTOMY DECOMPRESSION Left 8/31/2017    Procedure: Left L4-5, L5-S1 laminectomy;   R1 Post op check    Patient seen and examined at bedside, recently post-op. No acute complaints at this time. Pt voiding spontaneously and tolerating clears. Denies CP, SOB, N/V, fevers, and chills.    Vital Signs Last 24 Hours  T(C): 36.3 (05-24-24 @ 17:00), Max: 36.7 (05-24-24 @ 11:17)  HR: 78 (05-24-24 @ 17:30) (76 - 108)  BP: 113/76 (05-24-24 @ 17:00) (113/76 - 152/72)  RR: 15 (05-24-24 @ 17:30) (11 - 21)  SpO2: 99% (05-24-24 @ 17:30) (94% - 99%)    I&O's Summary    24 May 2024 07:01  -  24 May 2024 17:38  --------------------------------------------------------  IN: 240 mL / OUT: 125 mL / NET: 115 mL        Physical Exam:  General: NAD  CV: NR, RR, S1, S2, no M/R/G  Lungs: CTA-B  Abdomen: Soft, appropriately tender, softly-distended, +BS  Incision: Port site incision CDI  Ext: No pain or swelling. Venodynes in place    Labs:      MEDICATIONS  (STANDING):  buPROPion XL (24-Hour) . 150 milliGRAM(s) Oral daily  enoxaparin Injectable 40 milliGRAM(s) SubCutaneous every 24 hours  lisinopril 20 milliGRAM(s) Oral daily  QUEtiapine 25 milliGRAM(s) Oral at bedtime  sertraline 200 milliGRAM(s) Oral at bedtime    MEDICATIONS  (PRN):  fentaNYL    Injectable 50 MICROGram(s) IV Push every 5 minutes PRN Severe Pain (7 - 10)  ondansetron Injectable 4 milliGRAM(s) IV Push once PRN Nausea and/or Vomiting      77yo s/p RA BSO, RA Appendectomy, Peritoneal biopsies recovering well in acute post-operative state.    Neuro: Pain controlled. PO pain meds   CV: Hemodynamically stable  hx HTN on Lopressor PRN  Pulm: Encourage oob/ambulation, incentive spirometer at bedside  GI: Regular Diet, antiemetic PRN  : Voiding spontaneously  Heme: Lovenox and SCDs for DVT PPX  ID: afebrile  Endo: no active issues  Dispo: continue routine post-op care       Pina Pardo PGY-1 Surgeon: Galindo Hernandes MD;  Location: [facility] OR;  Service:    • ROTATOR CUFF REPAIR Right    • SHOULDER SURGERY Right 2013   • TONSILECTOMY, ADENOIDECTOMY, BILATERAL MYRINGOTOMY AND TUBES           Family History   Problem Relation Age of Onset   • Heart failure Mother    • Heart failure Father    • Diabetes Maternal Grandfather    • Malig Hyperthermia Neg Hx      Social History     Tobacco Use   • Smoking status: Former Smoker     Packs/day: 1.50     Years: 30.00     Pack years: 45.00     Types: Cigarettes     Quit date: 2020     Years since quittin.3   • Smokeless tobacco: Never Used   Substance Use Topics   • Alcohol use: Not Currently   • Drug use: Defer     Allergies:  Patient has no known allergies.    Objective   Vital Signs  Heart Rate:  [] 56  Resp:  [22-28] 28  BP: (108-210)/() 108/76  FiO2 (%):  [100 %] 100 %    Physical Exam:  Constitutional: Not in acute distress.  Eyes: Injected conjunctiva. Pupils equal and reactive to light.   ENMT: ET tube noted.  External nares normal.  Moist tongue.  Neck: No thyromegaly.  Trachea midline  Heart: RRR, no murmur.  Grade 2 pitting edema in legs.  Lungs: Good and equal air entry bilaterally with clear auscultation.  Mild coarse breath sounds.  No wheezing.  Nonlabored breathing  Abdomen: Obese. Soft. No tenderness or dullness.  Positive bowel sounds  Extremities: No cyanosis, clubbing. Warm extremities and well-perfused  Neuro: Sedated on mechanical ventilation.  Does not withdraw to pain or respond to verbal stimuli.  Psych: Cannot assess   Integumentary: No rash or ecchymosis  Lymphatic: No palpable cervical or supraclavicular lymph nodes.            Diagnostic imaging:  I personally and independently reviewed the following images:   CXR 2021: Diffuse bilateral pulmonary edema.      EKG/: NSR.  Partial RBBB.  Mild ST depression in V2 and V3.      Assessment   1. Acute pulmonary edema, probably secondary to  hypertension  2. Hypertensive emergency on presentation  3. Hypotension, probably induced by sedation and positive pressure ventilation  4. Acute hypercapnic and hypoxic respiratory failure  5. Moderate to severe MR  6. CAD s/p CABG in November 2020  7. Partial RBBB  8. Abnormal EKG: Mild ST depression in leads V2 and V3 of less than 2 mm.      Plan:  · Vent management: Reduced RR to 24.  Increase PEEP to 12.  Continue .  Titrate FiO2 to keep SPO2 >92% and I reduced it to 80%.  · Levophed to keep MAP >65.  This has been titrated down since the patient got admitted to the intensive care unit and he is now requiring low-dose which is basically to offset the effect of sedatives.  We will continue the in a peripheral line.  If his pressors requirement are increasing then place a central line.  We will try to titrate propofol down due to this matter as well  · Propofol.  Titrate to keep YOBANY -1-0. PRN fentanyl  · CXR in AM.  Redose of Lasix if he continues to show pulmonary infiltrates  · Noted normal troponin x1.  Repeat again in a.m. and repeat EKG due to abnormalities noted above  · Hold statin in acute illness  · Hold carvedilol due to hypotension  · Resume Plavix  · DVT prophylaxis with Lovenox    Discussed with ED provider and ICU staff    Angelica Hunt MD  04/02/21  00:20 EDT    Time: Critical care 41 min      This note was dictated utilizing Alitalia dictation

## 2024-05-24 NOTE — PATIENT PROFILE ADULT - FALL HARM RISK - HARM RISK INTERVENTIONS

## 2024-05-24 NOTE — CHART NOTE - NSCHARTNOTEFT_GEN_A_CORE
Post Operative Note  Patient: DARCIE GRANT 78y (1945) Female   MRN: [id number]  Location: [place] [address] A  Visit: 05-24-24 Inpatient  Date: 05-24-24 @ 22:25    Procedure: S/P  Robot assisted appendectomy with partial cecectomy and BSO    Subjective: Patient seen and examined post operatively. Reports pain as well controlled. Tolerating clear liquids thus far and voiding without difficulty. Denies nausea, vomiting, fever, chills, chest pain, SOB, cough.      Objective:  Vitals: T(F): 98 (05-24-24 @ 20:39), Max: 98 (05-24-24 @ 11:17)  HR: 86 (05-24-24 @ 20:39)  BP: 121/66 (05-24-24 @ 20:39) (113/76 - 166/78)  RR: 18 (05-24-24 @ 20:39)  SpO2: 97% (05-24-24 @ 20:39)  Vent Settings:     In:   05-24-24 @ 07:01  -  05-24-24 @ 22:25  --------------------------------------------------------  IN: 880 mL      IV Fluids:     Out:   05-24-24 @ 07:01  -  05-24-24 @ 22:25  --------------------------------------------------------  OUT: 125 mL      EBL:     Voided Urine:   05-24-24 @ 07:01  -  05-24-24 @ 22:25  --------------------------------------------------------  OUT: 125 mL        Physical Examination:  General: NAD, resting comfortably in bed  HEENT: Normocephalic atraumatic  Respiratory: Nonlabored respirations, normal CW expansion.  Cardio: S1S2, regular rate and rhythm.  Abdomen: soft, distended, non-tender, robotic port sites are c/d/i without ecchymosis or sign of hematoma. Minimal sanguinous strikethrough on steri strips  Vascular: extremities are warm and well perfused.     Imaging:  No post-op imaging studies    Assessment:  78yFemale patient S/P  Robot assisted appendectomy with partial cecectomy and BSO for mucinous neoplasm of appendix. Patient tolerated the procedure well, is recovering comfortably on the surgical floor, and is currently without complaints.     Plan:  - Pain control PRN PO  - Diet: CLD adv as tolerated  - Activity: as tolerated   - DVT ppx: SCD's & Lovenox    Date/Time: 05-24-24 @ 22:25

## 2024-05-24 NOTE — PROVIDER CONTACT NOTE (OTHER) - SITUATION
Patient admitted to 8T from PACU, patient arrived with Pad saturated with bright red blood, patient urinated and had blood on toilet paper when she wiped

## 2024-05-24 NOTE — ASU PREOP CHECKLIST - 3.
Right foot second digit, black discoloration raised skin, old skin tear dry, intact. Left hand ecchymosis. Dry flaky skin diffusely.

## 2024-05-24 NOTE — BRIEF OPERATIVE NOTE - NSICDXBRIEFPROCEDURE_GEN_ALL_CORE_FT
PROCEDURES:  Robot-assisted bilateral salpingo-oophorectomy (BSO) using da Scott Xi 24-May-2024 15:48:35  Gina Viera  
PROCEDURES:  Robot-assisted appendectomy 24-May-2024 14:15:57  Carlton JACKSON

## 2024-05-24 NOTE — BRIEF OPERATIVE NOTE - OPERATION/FINDINGS
Mucinous appendiceal mass with appendiceal wall perforation but mucin is possibly contained. Appendectomy with partial cecectomy. GYN/ONC team took over for BSO.
Abdominal pelvic survey demonstrated 7 x 5 simple appearing cyst with multiple septations arising from the left ovary. Right ovary with 6x 4cm simple appearing cyst with multiple loculations. Uterus with a smooth uterine contour approximately 6w in size. Adnexa were placed in separate bags with a controlled ruputre within the bag at the level of the umbilicus. Smooth lined with clear fluid.

## 2024-05-25 LAB
ANION GAP SERPL CALC-SCNC: 13 MMOL/L — SIGNIFICANT CHANGE UP (ref 7–14)
BUN SERPL-MCNC: 6 MG/DL — LOW (ref 7–23)
CALCIUM SERPL-MCNC: 7.8 MG/DL — LOW (ref 8.4–10.5)
CHLORIDE SERPL-SCNC: 102 MMOL/L — SIGNIFICANT CHANGE UP (ref 98–107)
CO2 SERPL-SCNC: 20 MMOL/L — LOW (ref 22–31)
CREAT SERPL-MCNC: 0.49 MG/DL — LOW (ref 0.5–1.3)
EGFR: 96 ML/MIN/1.73M2 — SIGNIFICANT CHANGE UP
GLUCOSE SERPL-MCNC: 85 MG/DL — SIGNIFICANT CHANGE UP (ref 70–99)
HCT VFR BLD CALC: 36.7 % — SIGNIFICANT CHANGE UP (ref 34.5–45)
HGB BLD-MCNC: 12.4 G/DL — SIGNIFICANT CHANGE UP (ref 11.5–15.5)
MCHC RBC-ENTMCNC: 27.5 PG — SIGNIFICANT CHANGE UP (ref 27–34)
MCHC RBC-ENTMCNC: 33.8 GM/DL — SIGNIFICANT CHANGE UP (ref 32–36)
MCV RBC AUTO: 81.4 FL — SIGNIFICANT CHANGE UP (ref 80–100)
NRBC # BLD: 0 /100 WBCS — SIGNIFICANT CHANGE UP (ref 0–0)
NRBC # FLD: 0 K/UL — SIGNIFICANT CHANGE UP (ref 0–0)
PLATELET # BLD AUTO: 243 K/UL — SIGNIFICANT CHANGE UP (ref 150–400)
POTASSIUM SERPL-MCNC: 3.8 MMOL/L — SIGNIFICANT CHANGE UP (ref 3.5–5.3)
POTASSIUM SERPL-SCNC: 3.8 MMOL/L — SIGNIFICANT CHANGE UP (ref 3.5–5.3)
RBC # BLD: 4.51 M/UL — SIGNIFICANT CHANGE UP (ref 3.8–5.2)
RBC # FLD: 16.2 % — HIGH (ref 10.3–14.5)
SODIUM SERPL-SCNC: 135 MMOL/L — SIGNIFICANT CHANGE UP (ref 135–145)
WBC # BLD: 9.12 K/UL — SIGNIFICANT CHANGE UP (ref 3.8–10.5)
WBC # FLD AUTO: 9.12 K/UL — SIGNIFICANT CHANGE UP (ref 3.8–10.5)

## 2024-05-25 RX ORDER — POTASSIUM CHLORIDE 20 MEQ
20 PACKET (EA) ORAL ONCE
Refills: 0 | Status: COMPLETED | OUTPATIENT
Start: 2024-05-25 | End: 2024-05-25

## 2024-05-25 RX ADMIN — BUPROPION HYDROCHLORIDE 150 MILLIGRAM(S): 150 TABLET, EXTENDED RELEASE ORAL at 12:58

## 2024-05-25 RX ADMIN — ENOXAPARIN SODIUM 40 MILLIGRAM(S): 100 INJECTION SUBCUTANEOUS at 06:36

## 2024-05-25 RX ADMIN — SERTRALINE 200 MILLIGRAM(S): 25 TABLET, FILM COATED ORAL at 21:40

## 2024-05-25 RX ADMIN — LISINOPRIL 20 MILLIGRAM(S): 2.5 TABLET ORAL at 06:35

## 2024-05-25 RX ADMIN — Medication 20 MILLIEQUIVALENT(S): at 17:29

## 2024-05-25 RX ADMIN — QUETIAPINE FUMARATE 25 MILLIGRAM(S): 200 TABLET, FILM COATED ORAL at 21:40

## 2024-05-25 NOTE — PROGRESS NOTE ADULT - ASSESSMENT
79yo POD# 1 from a RA BSO, RA Appendectomy, Peritoneal biopsies= benign,  recovering well in acute post-operative state.    Neuro: Pain controlled. PO pain meds   CV: Hemodynamically stable  On home BP meds  Pulm: Encourage oob/ambulation, incentive spirometer at bedside  GI: Tolerating CLD w/ ensure  : Voiding spontaneously  Heme: Lovenox and SCDs for DVT PPX  ID: afebrile  Endo: no active issues  Dispo: continue routine post-op care     Calista PGY2 79yo POD# 1 from a RA BSO, RA Appendectomy, and Peritoneal biopsies,  recovering well in acute post-operative state.    Neuro: Pain controlled. PO pain meds   CV: Hemodynamically stable  On home BP meds  Pulm: Encourage oob/ambulation, incentive spirometer at bedside  GI: Tolerating CLD w/ ensure  : Voiding spontaneously  Heme: Lovenox and SCDs for DVT PPX  ID: afebrile  Endo: no active issues  Dispo: continue routine post-op care     Calista PGY2

## 2024-05-25 NOTE — PROGRESS NOTE ADULT - NSPROGADDITIONALINFOA_GEN_ALL_CORE
Fellow Addendum    Pt seen and examined at bedside. Agree with above.    VS reviewed  Labs reviewed    Hemodynamically stable.  Continue current pain regimen  Reg diet. Passing flatus. Voiding spontaneously  Encourage ambulation and IS use  Replete lytes prn  DVT ppx: Aicha sam MD Fellow Addendum    Pt seen and examined at bedside. Agree with above.    VS reviewed  Labs reviewed    Hemodynamically stable.  Continue current pain regimen  Tolerating diet. Passing flatus. Voiding spontaneously.  Encourage ambulation and IS use  Replete lytes prn  DVT ppx: lovenox, Venodynes   Appreciate excellent care per primary team    Swetha Mclean MD

## 2024-05-25 NOTE — PROGRESS NOTE ADULT - ATTENDING COMMENTS
Patient seen and examined at bedside with team, agree with above note, including assessment and plan. Abdomen benign. Discussed today's plan of care with patient, all questions answered. Continue routine postop care, d/c planning per primary team.

## 2024-05-25 NOTE — PROGRESS NOTE ADULT - SUBJECTIVE AND OBJECTIVE BOX
Subjective:   Pt seen and examined at bedside. No events overnight. Pain well controlled. Patient ambulating. Passing flatus. Tolerating regular diet. Pt denies fever, chills, chest pain, SOB, nausea, vomiting, lightheadedness, dizziness.      Objective:    MEDICATIONS  (STANDING):  buPROPion XL (24-Hour) . 150 milliGRAM(s) Oral daily  enoxaparin Injectable 40 milliGRAM(s) SubCutaneous every 24 hours  lisinopril 20 milliGRAM(s) Oral daily  QUEtiapine 25 milliGRAM(s) Oral at bedtime  sertraline 200 milliGRAM(s) Oral at bedtime    MEDICATIONS  (PRN):  acetaminophen     Tablet .. 975 milliGRAM(s) Oral every 6 hours PRN Mild Pain (1 - 3)  oxyCODONE    IR 2.5 milliGRAM(s) Oral every 4 hours PRN Moderate Pain (4 - 6)      T(F): 97.4 (05-25-24 @ 00:27), Max: 98 (05-24-24 @ 11:17)  HR: 79 (05-25-24 @ 00:27) (76 - 108)  BP: 111/59 (05-25-24 @ 00:27) (111/59 - 166/78)  RR: 18 (05-25-24 @ 00:27) (11 - 21)  SpO2: 94% (05-25-24 @ 00:27) (94% - 99%)  Wt(kg): --    Physical Exam:  Constitutional: NAD, A+O x3  CV: RRR  Lungs: Clear to auscultation bilaterally  Abdomen: Soft, nondistended, no guarding or rebound tenderness. Normal bowel sounds  Incision: Clean, dry, intact  : No bleeding on pad  Extremities: No lower extremity edema or calf tenderness bilaterally; venodynes in place    LABS:  05-24    133<L>  |  99     |  6<L>   ----------------------------<  133<H>  3.8     |  19<L>  |  0.38<L>    Ca    7.7<L>      24 May 2024 20:13  Phos  2.4       05-24  Mg     1.90      05-24            Urinalysis Basic - ( 24 May 2024 20:13 )    Color: x / Appearance: x / SG: x / pH: x  Gluc: 133 mg/dL / Ketone: x  / Bili: x / Urobili: x   Blood: x / Protein: x / Nitrite: x   Leuk Esterase: x / RBC: x / WBC x   Sq Epi: x / Non Sq Epi: x / Bacteria: x      CAPILLARY BLOOD GLUCOSE      POCT Blood Glucose.: 119 mg/dL (24 May 2024 11:15)      I&O's Summary    24 May 2024 07:01  -  25 May 2024 05:44  --------------------------------------------------------  IN: 880 mL / OUT: 525 mL / NET: 355 mL         Subjective:   Pt seen and examined at bedside. No events overnight. Pain well controlled. Patient ambulating. Passing flatus. Tolerating regular diet. Pt denies fever, chills, chest pain, SOB, nausea, vomiting, lightheadedness, dizziness.      Objective:    MEDICATIONS  (STANDING):  buPROPion XL (24-Hour) . 150 milliGRAM(s) Oral daily  enoxaparin Injectable 40 milliGRAM(s) SubCutaneous every 24 hours  lisinopril 20 milliGRAM(s) Oral daily  QUEtiapine 25 milliGRAM(s) Oral at bedtime  sertraline 200 milliGRAM(s) Oral at bedtime    MEDICATIONS  (PRN):  acetaminophen     Tablet .. 975 milliGRAM(s) Oral every 6 hours PRN Mild Pain (1 - 3)  oxyCODONE    IR 2.5 milliGRAM(s) Oral every 4 hours PRN Moderate Pain (4 - 6)      T(F): 97.4 (05-25-24 @ 00:27), Max: 98 (05-24-24 @ 11:17)  HR: 79 (05-25-24 @ 00:27) (76 - 108)  BP: 111/59 (05-25-24 @ 00:27) (111/59 - 166/78)  RR: 18 (05-25-24 @ 00:27) (11 - 21)  SpO2: 94% (05-25-24 @ 00:27) (94% - 99%)  Wt(kg): --    Physical Exam:  Constitutional: NAD, A+O x3  CV: RRR  Lungs: Clear to auscultation bilaterally  Abdomen: Softly distended, no guarding or rebound tenderness. Normal bowel sounds  Incision: Clean, dry, intact  : No bleeding on pad  Extremities: No lower extremity edema or calf tenderness bilaterally; venodynes in place    LABS:  05-24    133<L>  |  99     |  6<L>   ----------------------------<  133<H>  3.8     |  19<L>  |  0.38<L>    Ca    7.7<L>      24 May 2024 20:13  Phos  2.4       05-24  Mg     1.90      05-24            Urinalysis Basic - ( 24 May 2024 20:13 )    Color: x / Appearance: x / SG: x / pH: x  Gluc: 133 mg/dL / Ketone: x  / Bili: x / Urobili: x   Blood: x / Protein: x / Nitrite: x   Leuk Esterase: x / RBC: x / WBC x   Sq Epi: x / Non Sq Epi: x / Bacteria: x      CAPILLARY BLOOD GLUCOSE      POCT Blood Glucose.: 119 mg/dL (24 May 2024 11:15)      I&O's Summary    24 May 2024 07:01  -  25 May 2024 05:44  --------------------------------------------------------  IN: 880 mL / OUT: 525 mL / NET: 355 mL

## 2024-05-25 NOTE — PROGRESS NOTE ADULT - SUBJECTIVE AND OBJECTIVE BOX
Surgery Progress Note    S: Patient seen and examined. No acute events overnight. Feeling a little bloated this AM. Reports passing some gas, no BM. Tolerating regular diet, no n/v.     O:  Physical Exam:  Gen: Laying in bed, NAD  HEENT: Atraumatic  Resp: Unlabored breathing  Abd: soft, nontender, some distention, port sites c/d/i  Ext: Moves 4 extremities spontaneously    Vital Signs Last 24 Hrs  T(C): 36.4 (25 May 2024 11:37), Max: 36.7 (24 May 2024 20:39)  T(F): 97.5 (25 May 2024 11:37), Max: 98 (24 May 2024 20:39)  HR: 94 (25 May 2024 11:37) (76 - 94)  BP: 124/69 (25 May 2024 11:37) (111/59 - 166/78)  BP(mean): 79 (24 May 2024 18:00) (70 - 95)  RR: 18 (25 May 2024 11:37) (11 - 21)  SpO2: 99% (25 May 2024 11:37) (94% - 99%)    Parameters below as of 25 May 2024 11:37  Patient On (Oxygen Delivery Method): room air        I&O's Detail    24 May 2024 07:01  -  25 May 2024 07:00  --------------------------------------------------------  IN:    Oral Fluid: 1120 mL  Total IN: 1120 mL    OUT:    Voided (mL): 1325 mL  Total OUT: 1325 mL    Total NET: -205 mL      25 May 2024 07:01  -  25 May 2024 14:51  --------------------------------------------------------  IN:    Oral Fluid: 480 mL  Total IN: 480 mL    OUT:    Stool (mL): 1 mL    Voided (mL): 650 mL  Total OUT: 651 mL    Total NET: -171 mL                                12.4   9.12  )-----------( 243      ( 25 May 2024 05:27 )             36.7       05-25    135  |  102  |  6<L>  ----------------------------<  85  3.8   |  20<L>  |  0.49<L>    Ca    7.8<L>      25 May 2024 05:27  Phos  2.4     05-24  Mg     1.90     05-24

## 2024-05-25 NOTE — PROGRESS NOTE ADULT - ASSESSMENT
78F w/ PMH of HLD, HTN, osteoporosis, spinal compression fracture, severe depression, anxiety, panic disorder, s/p robotic BSO, appendectomy on 5/24 for appendix mucocele suspicious for carcinoma and b/l adnexal multiloculated cystic lesions.    Plan:  - LRD  - Pain control prn  - OOBA  - Continue home meds  - Lovenox for DVT ppx  - Possible discharge home today vs. tomorrow    E Team [id number]

## 2024-05-26 ENCOUNTER — TRANSCRIPTION ENCOUNTER (OUTPATIENT)
Age: 79
End: 2024-05-26

## 2024-05-26 VITALS
OXYGEN SATURATION: 97 % | SYSTOLIC BLOOD PRESSURE: 136 MMHG | HEART RATE: 92 BPM | RESPIRATION RATE: 17 BRPM | TEMPERATURE: 99 F | DIASTOLIC BLOOD PRESSURE: 70 MMHG

## 2024-05-26 RX ORDER — OXYCODONE HYDROCHLORIDE 5 MG/1
1 TABLET ORAL
Qty: 12 | Refills: 0
Start: 2024-05-26 | End: 2024-05-28

## 2024-05-26 RX ORDER — ACETAMINOPHEN 500 MG
3 TABLET ORAL
Qty: 0 | Refills: 0 | DISCHARGE
Start: 2024-05-26

## 2024-05-26 RX ADMIN — ENOXAPARIN SODIUM 40 MILLIGRAM(S): 100 INJECTION SUBCUTANEOUS at 05:22

## 2024-05-26 RX ADMIN — BUPROPION HYDROCHLORIDE 150 MILLIGRAM(S): 150 TABLET, EXTENDED RELEASE ORAL at 12:21

## 2024-05-26 RX ADMIN — LISINOPRIL 20 MILLIGRAM(S): 2.5 TABLET ORAL at 05:22

## 2024-05-26 NOTE — PROGRESS NOTE ADULT - ASSESSMENT
78F w/ PMH of HLD, HTN, osteoporosis, spinal compression fracture, severe depression, anxiety, panic disorder, s/p robotic BSO, appendectomy on 5/24 for appendix mucocele suspicious for carcinoma and b/l adnexal multiloculated cystic lesions.    Plan:  - LRD  - Pain control prn  - OOBA  - Continue home meds  - Lovenox for DVT ppx  - Home today     E Team [id number]

## 2024-05-26 NOTE — PROGRESS NOTE ADULT - NSPROGADDITIONALINFOA_GEN_ALL_CORE
Fellow Addendum    POD#2 s/p RA-appendectomy, partial cecectomy, BSO for appendiceal mucocele    Pt seen and examined at bedside. Agree with above.    VS reviewed. Hemodynamically stable.  Continue current pain regimen  Tolerating diet. Passing flatus. Voiding spontaneously.  Abd distended, however improved from yesterday. +tympany. No rebound or guarding. Encourage ambulation and IS use  Replete lytes prn  DVT ppx: lovenox, Venodynes   Appreciate excellent care per primary team    Swetha Mclean MD

## 2024-05-26 NOTE — PROGRESS NOTE ADULT - SUBJECTIVE AND OBJECTIVE BOX
Surg/Onc Daily Resident Progress Note    OVERNIGHT:  No acute events.     SUBJECTIVE:S: Patient seen and examined without complains. Pain well controlled. tolerating diet, voiding.     OBJECTIVE:  Vital Signs Last 24 Hrs  T(C): 37.2 (26 May 2024 11:54), Max: 37.9 (25 May 2024 20:07)  T(F): 98.9 (26 May 2024 11:54), Max: 100.2 (25 May 2024 20:07)  HR: 92 (26 May 2024 11:54) (87 - 96)  BP: 136/70 (26 May 2024 11:54) (128/54 - 146/71)  BP(mean): --  RR: 17 (26 May 2024 11:54) (17 - 18)  SpO2: 97% (26 May 2024 11:54) (96% - 98%)    Parameters below as of 26 May 2024 11:54  Patient On (Oxygen Delivery Method): room air      Physical Exam:  Gen: Laying in bed, NAD  HEENT: Atraumatic  Resp: Unlabored breathing  Abd: soft, nontender, some distention, port sites c/d/i  Ext: Moves 4 extremities spontaneously          Medications:  MEDICATIONS  (STANDING):  buPROPion XL (24-Hour) . 150 milliGRAM(s) Oral daily  enoxaparin Injectable 40 milliGRAM(s) SubCutaneous every 24 hours  lisinopril 20 milliGRAM(s) Oral daily  QUEtiapine 25 milliGRAM(s) Oral at bedtime  sertraline 200 milliGRAM(s) Oral at bedtime    MEDICATIONS  (PRN):  acetaminophen     Tablet .. 975 milliGRAM(s) Oral every 6 hours PRN Mild Pain (1 - 3)  oxyCODONE    IR 2.5 milliGRAM(s) Oral every 4 hours PRN Moderate Pain (4 - 6)    Allergies:  No Known Allergies      Labs:  05-25    135  |  102  |  6<L>  ----------------------------<  85  3.8   |  20<L>  |  0.49<L>    Ca    7.8<L>      25 May 2024 05:27  Phos  2.4     05-24  Mg     1.90     05-24                            12.4   9.12  )-----------( 243      ( 25 May 2024 05:27 )             36.7

## 2024-05-26 NOTE — DISCHARGE NOTE PROVIDER - NSDCMRMEDTOKEN_GEN_ALL_CORE_FT
acetaminophen 325 mg oral tablet: 3 tab(s) orally every 6 hours As needed Mild Pain (1 - 3)  buPROPion 100 mg/12 hours (SR) oral tablet, extended release: 1 tab(s) orally once a day AM  lisinopril 30 mg oral tablet: 1 tab(s) orally once a day AM  oxyCODONE 5 mg oral tablet: 1 tab(s) orally every 6 hours as needed for  severe pain MDD: 4  Seroquel 25 mg oral tablet: 1 tab(s) orally once a day (at bedtime)  sertraline 100 mg oral tablet: 2 tab(s) orally once a day (at bedtime)

## 2024-05-26 NOTE — DISCHARGE NOTE PROVIDER - HOSPITAL COURSE
78 year old Uzbek speaking female with hx of HLD, HTN, osteoporosis, spinal compression fracture, severe depression - tx w/ ECT weekly (then biweekly starting 4/15), anxiety, panic disorder complaining of diffuse, severe LUQ and pelvic pain since early March 2024,went to [facility] 04/24, CT A/P 4/1/2024 showed appendix mucocele containing an area of nodular wall thickening - suspicious for carcinoma and bilateral adnexal multiloculated cystic lesions, suspicious for ovarian mets rather than primary malignancy. Patent admitted to surgery for scheduled surgery. She was made NPO and started on IVF he went to the OR for robot-assisted appendectomy and bilateral salpingo-oophorectomy. Patient tolerated the procedure well, without complication. Patient remained hemodynamically stable in the PACU and transferred to the surgical floor. Diet was restarted and advanced as tolerated. Pain control was transitioned from IV to PO pain meds. At this time, patient is currently ambulating, voiding, tolerating a regular diet. Pain well controlled on PO pain meds. Patient has been deemed stable for discharge home with follow up as an outpatient.

## 2024-05-26 NOTE — DISCHARGE NOTE PROVIDER - NSDCCPCAREPLAN_GEN_ALL_CORE_FT
PRINCIPAL DISCHARGE DIAGNOSIS  Diagnosis: Neoplasm of appendix  Assessment and Plan of Treatment:

## 2024-05-26 NOTE — DISCHARGE NOTE NURSING/CASE MANAGEMENT/SOCIAL WORK - NSDCPEFALRISK_GEN_ALL_CORE
For information on Fall & Injury Prevention, visit: [email or web address] OR  [email or web address] OR  https://www.cdc.gov/steadi/patient.html

## 2024-05-26 NOTE — DISCHARGE NOTE PROVIDER - CARE PROVIDER_API CALL
Masood Lopez  Surgery  [address] [place] [id number]  Phone: [phone]  Fax: [phone]  Follow Up Time: 2 weeks

## 2024-05-26 NOTE — PROGRESS NOTE ADULT - ASSESSMENT
77yo POD# 2 from a RA BSO, RA Appendectomy, and Peritoneal biopsies,  recovering well in acute post-operative state.    Neuro: Pain controlled. PO pain meds   CV: Hemodynamically stable  On home BP meds  Pulm: Encourage oob/ambulation, incentive spirometer at bedside  GI: Tolerating CLD w/ ensure  : Voiding spontaneously  Heme: Lovenox and SCDs for DVT PPX  ID: afebrile  Endo: no active issues  Dispo: continue routine post-op care     Calista PGY2

## 2024-05-26 NOTE — DISCHARGE NOTE NURSING/CASE MANAGEMENT/SOCIAL WORK - PATIENT PORTAL LINK FT
You can access the FollowMyHealth Patient Portal offered by [facility] by registering at the following website: [email or web address]. By joining [employer]’s FollowMyHealth portal, you will also be able to view your health information using other applications (apps) compatible with our system.

## 2024-05-26 NOTE — DISCHARGE NOTE PROVIDER - NSDCCPTREATMENT_GEN_ALL_CORE_FT
PRINCIPAL PROCEDURE  Procedure: Robot-assisted appendectomy  Findings and Treatment: WOUND CARE:  Please keep incisions clean and dry. Please do not Scrub or rub incisions. DO NOT use lotion or powder on incisions.   BATHING: You may shower and/or sponge bathe. You may use warm soapy water in the shower and rinse, pat dry. NO baths no pools for 6 weeks.  ACTIVITY: No heavy lifting or straining. Otherwise, you may return to your usual level of physical activity.   Take Tylenol 500mg every 6 hours as needed for mild to moderate pain if your pain continues take oxycodone 5 mg. If you are taking narcotic pain (oxycodone) medication DO NOT drive a car, operate machinery or make important decisions.  DIET: Return to your usual diet.  NOTIFY YOUR SURGEON IF YOU HAVE: any bleeding that does not stop, any pus draining from your wound(s), any fever (over 100.4 F) persistent nausea/vomiting, or if your pain is not controlled on your discharge pain medications, unable to urinate.  Please follow up with your primary care physician in one week regarding your hospitalization, bring copies of your discharge paperwork.  Please follow up with your surgeon, Dr. Lopez in 2 weeks

## 2024-05-26 NOTE — PROGRESS NOTE ADULT - SUBJECTIVE AND OBJECTIVE BOX
Subjective:   Pt seen and examined at bedside. No events overnight. Pain well controlled. Patient ambulating. Passing flatus. Tolerating regular diet. Pt denies fever, chills, chest pain, SOB, nausea, vomiting, lightheadedness, dizziness.      Objective:    MEDICATIONS  (STANDING):  buPROPion XL (24-Hour) . 150 milliGRAM(s) Oral daily  enoxaparin Injectable 40 milliGRAM(s) SubCutaneous every 24 hours  lisinopril 20 milliGRAM(s) Oral daily  QUEtiapine 25 milliGRAM(s) Oral at bedtime  sertraline 200 milliGRAM(s) Oral at bedtime    MEDICATIONS  (PRN):  acetaminophen     Tablet .. 975 milliGRAM(s) Oral every 6 hours PRN Mild Pain (1 - 3)  oxyCODONE    IR 2.5 milliGRAM(s) Oral every 4 hours PRN Moderate Pain (4 - 6)      T(F): 98.8 (05-26-24 @ 07:50), Max: 100.2 (05-25-24 @ 20:07)  HR: 96 (05-26-24 @ 07:50) (87 - 96)  BP: 142/68 (05-26-24 @ 07:50) (124/69 - 146/71)  RR: 17 (05-26-24 @ 07:50) (17 - 18)  SpO2: 96% (05-26-24 @ 07:50) (96% - 99%)  Wt(kg): --    Physical Exam:  Constitutional: NAD, A+O x3  CV: RRR  Lungs: Clear to auscultation bilaterally  Abdomen: Softly distended, no guarding or rebound tenderness. Normal bowel sounds  Incision: Clean, dry, intact  : No bleeding on pad  Extremities: No lower extremity edema or calf tenderness bilaterally; venodynes in place      LABS:  05-25    135    |  102    |  6<L>   ----------------------------<  85     3.8     |  20<L>  |  0.49<L>    Ca    7.8<L>      25 May 2024 05:27            Urinalysis Basic - ( 25 May 2024 05:27 )    Color: x / Appearance: x / SG: x / pH: x  Gluc: 85 mg/dL / Ketone: x  / Bili: x / Urobili: x   Blood: x / Protein: x / Nitrite: x   Leuk Esterase: x / RBC: x / WBC x   Sq Epi: x / Non Sq Epi: x / Bacteria: x      CAPILLARY BLOOD GLUCOSE          I&O's Summary    25 May 2024 07:01  -  26 May 2024 07:00  --------------------------------------------------------  IN: 1440 mL / OUT: 956 mL / NET: 484 mL

## 2024-05-27 ENCOUNTER — NON-APPOINTMENT (OUTPATIENT)
Age: 79
End: 2024-05-27

## 2024-05-28 ENCOUNTER — NON-APPOINTMENT (OUTPATIENT)
Age: 79
End: 2024-05-28

## 2024-05-28 DIAGNOSIS — E04.1 NONTOXIC SINGLE THYROID NODULE: ICD-10-CM

## 2024-05-28 PROBLEM — R73.03 PREDIABETES: Chronic | Status: ACTIVE | Noted: 2024-05-21

## 2024-05-28 PROBLEM — F41.9 ANXIETY DISORDER, UNSPECIFIED: Chronic | Status: ACTIVE | Noted: 2024-05-21

## 2024-05-28 PROBLEM — K38.8 OTHER SPECIFIED DISEASES OF APPENDIX: Chronic | Status: ACTIVE | Noted: 2024-05-21

## 2024-05-28 PROBLEM — N94.89 OTHER SPECIFIED CONDITIONS ASSOCIATED WITH FEMALE GENITAL ORGANS AND MENSTRUAL CYCLE: Chronic | Status: ACTIVE | Noted: 2024-05-21

## 2024-06-01 NOTE — ASU PATIENT PROFILE, ADULT - INTERNATIONAL TRAVEL
29 yo woman with ovarian/ adnexal mass     s/p TREVON- BSO, LN dissection 5/21  frozen -- carcinoma   final path pending     returns with fever, SOB, pleuritic chest pain     in retrospect patient reports was having high fever prior to surgery     CT no PE, pulmonary nodules, hypoattenuating structure surrounding surgical clip along ligated ovarian vein     Fever related to infection vs malignancy     Bacteruria with pyuria. Denies dysria    follow blood cultures from 5/30-NGTD    pelvic US    c/w zosyn for now     in addition pulmonary eval      No

## 2024-06-04 LAB — SURGICAL PATHOLOGY STUDY: SIGNIFICANT CHANGE UP

## 2024-06-05 ENCOUNTER — NON-APPOINTMENT (OUTPATIENT)
Age: 79
End: 2024-06-05

## 2024-06-05 ENCOUNTER — APPOINTMENT (OUTPATIENT)
Dept: GYNECOLOGIC ONCOLOGY | Facility: CLINIC | Age: 79
End: 2024-06-05
Payer: MEDICARE

## 2024-06-05 VITALS — DIASTOLIC BLOOD PRESSURE: 85 MMHG | HEART RATE: 101 BPM | SYSTOLIC BLOOD PRESSURE: 168 MMHG | TEMPERATURE: 97.8 F

## 2024-06-05 DIAGNOSIS — Z48.89 ENCOUNTER FOR OTHER SPECIFIED SURGICAL AFTERCARE: ICD-10-CM

## 2024-06-05 LAB
CYTOLOGY CVX/VAG DOC THIN PREP: ABNORMAL
HPV HIGH+LOW RISK DNA PNL CVX: NOT DETECTED

## 2024-06-05 PROCEDURE — 99459 PELVIC EXAMINATION: CPT

## 2024-06-05 PROCEDURE — 99212 OFFICE O/P EST SF 10 MIN: CPT

## 2024-06-05 NOTE — LETTER BODY
[Dear  ___] : Dear  [unfilled], [I recently saw our patient [unfilled] for a follow-up visit.] : I recently saw our patient, [unfilled] for a follow-up visit. [Attached please find my note.] : Attached please find my note. [FreeTextEntry2] :   Pt is s/p Robotic-assisted bilateral salpingo-oophorectomy as well as a robotic appendectomy and cecectomy by Dr. Lopez on 5/24/24 [FreeTextEntry1] : final path

## 2024-06-05 NOTE — DISCUSSION/SUMMARY
[Clean] : was clean [Dry] : was dry [Intact] : was intact [None] : had no drainage [Normal Skin] : normal appearance [Normal Skin Turgor] : normal skin turgor [Doing Well] : is doing well [Excellent Pain Control] : has excellent pain control [No Sign of Infection] : is showing no signs of infection [Erythema] : was not erythematous [Ecchymosis] : was not ecchymotic [Firm] : soft [Tender] : nontender [Abnormal Bowel Sounds] : normal bowel sounds [Rebound] : no rebound tenderness [Guarding] : no guarding [Vaginal Exam Abnormal] : normal vaginal exam

## 2024-06-05 NOTE — ASSESSMENT
[FreeTextEntry1] :     -  abdominal incisions healing well -  no s/s infection, no bleeding,  -  increase protein for wound healing.  Importance of plant based protein diet for health discussed.   -  reiterated post op education including:    -  no heavy lifting greater than 5-10 pounds for 6-8 weeks.    -  increase ambulation as tolerated.   No heavy or excess exercise for 6-8 weeks.    -  Continue showers only.  Pat dry incisions for the next 6-8 weeks. -  final path reviewed - benign ovarian cysts - copy provided to patient.  Appendectomy and cecectomy pathology still pending and will be discussed with patient by Dr. Lopez when path returns.   -  Advised to follow up with primary GYN in 6-12 months and discussed the importance of follow up GYN care.  -  Pt would like a referral to GYN, prefers Greek speaking - will request navigation and assistance by our RN Nurse Navigator, Mary, to assist with GYN planning. -  No further visits are required at this time, however, she may return to the office as needed if any issues arise -  Patient 's daughter, Joselyn translated for this visit and verbalized understanding of plan and agrees. -  All questions answered.

## 2024-06-05 NOTE — REASON FOR VISIT
[Post Op] : post op visit [de-identified] : 5/24/2024 [de-identified] : Robotic-assisted bilateral salpingo-oophorectomy as well as a robotic appendectomy and cecectomy by Dr. Lopez    [de-identified] : Denies f/c/n/v/d/vaginal bleeding.  Overall feels well.  Meeting milestones of recovery.  Regular BM and voiding freely.  Final pathology: 1. Appendix - Will be reported by GI staff pathologist as an addendum.  2. Right bladder peritoneal biopsy - Benign fibroadipose tissue with refractile material and foreign body giant cell reaction  3. Right fallopian tube  T  ovary, salpingo-oophorectomy - Serous cystadenoma, ovary - Unremarkable fimbriated fallopian tube  4. Left fallopian tube  T  ovary, salpingo-oophorectomy - Serous cystadenoma, ovary - Unremarkable fimbriated fallopian tube  Part-3 and Part-4: On immunohistochemistry, cyst lining cells are positive for WT1.  This immunoprofile is consistent with above diagnosis

## 2024-06-06 ENCOUNTER — NON-APPOINTMENT (OUTPATIENT)
Age: 79
End: 2024-06-06

## 2024-06-06 ENCOUNTER — APPOINTMENT (OUTPATIENT)
Dept: SURGICAL ONCOLOGY | Facility: CLINIC | Age: 79
End: 2024-06-06
Payer: MEDICARE

## 2024-06-06 VITALS
WEIGHT: 110 LBS | DIASTOLIC BLOOD PRESSURE: 87 MMHG | SYSTOLIC BLOOD PRESSURE: 148 MMHG | HEIGHT: 55 IN | HEART RATE: 103 BPM | OXYGEN SATURATION: 98 % | BODY MASS INDEX: 25.46 KG/M2

## 2024-06-06 PROCEDURE — 99024 POSTOP FOLLOW-UP VISIT: CPT

## 2024-06-09 NOTE — ASSESSMENT
[FreeTextEntry1] : Fatmata will follow-up in 2 weeks. From a surgical standpoint she is clear to restart ECT. We will review her appendiceal pathology once it is available.  We discussed the potential next steps including close observation, and the returning to the OR for HIPEC.  All medical entries were at my, Dr. Masood Lopez, direction. I have reviewed the chart and agree that the record accurately reflects my personal performance of the history, physical exam, assessment, and plan.  Our office nurse practitioner was present for the duration of the office visit.

## 2024-06-09 NOTE — PHYSICAL EXAM
[Normal] : supple, no neck mass and thyroid not enlarged [Normal Neck Lymph Nodes] : normal neck lymph nodes  [Normal Supraclavicular Lymph Nodes] : normal supraclavicular lymph nodes [Normal Groin Lymph Nodes] : normal groin lymph nodes [Normal Axillary Lymph Nodes] : normal axillary lymph nodes [Normal] : oriented to person, place and time, with appropriate affect [de-identified] : trochar sites healing well

## 2024-06-09 NOTE — CONSULT LETTER
[Dear  ___] : Dear  [unfilled], [Consult Letter:] : I had the pleasure of evaluating your patient, [unfilled]. [Please see my note below.] : Please see my note below. [Consult Closing:] : Thank you very much for allowing me to participate in the care of this patient.  If you have any questions, please do not hesitate to contact me. [Sincerely,] : Sincerely, [DrJaylene  ___] : Dr. ALMANZA [DrJaylene ___] : Dr. ALMANZA [FreeTextEntry2] : Ana Mariano MD [FreeTextEntry3] : Masood Lopez MD Surgical Oncology [facility] Office: [phone] Cell: [phone]

## 2024-06-09 NOTE — HISTORY OF PRESENT ILLNESS
[de-identified] : Ms. FATMATA GRANT is a 78-year-old woman, referred by Dr. Ana Mariano for consultation regarding a pelvic mass, here for a post-op visit. Fatmata is primarily Uzbek speaking, accompanied by her 3 daughters, Renate Harris & Joselyn, who assist with translation.   Fatmata reports diffuse and severe LUQ pain that started in early 2024, eventually prompting an ED visit to [facility] in April.   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 Menopause 50's. . Age of first full-term pregnancy 24. No OCP/HRT use. last colonoscopy >10 years prior and WNL.  ECOG 1-2  2024 - Fatmata is here for an initial consultation, accompanied by her 3 daughters, Kimberly Dewitt & Joselyn. Fatmata's memory waxes and wanes at times, mostly from the ECT side effects but this also started before her treatments. She reports diffuse/severe LUQ/Left flank abdominal pain that started in early March and eventually prompted a visit to the ED. The pain is also prominent in her pelvic region. Her appetite has been fairly maintained, and she has had ~5 lbs of unintentional weight loss since last month. She denies any nausea, vomiting, diarrhea or vaginal bleeding/cramping. The pain is fairly well managed with Advil.  We discussed the need for further evaluation of the appendiceal and adenexal masses.  Fatmata will undergo bloodwork (including CEA, and Ca125) today.  She will also get a PET CT.  She will see Gyn Oncology, and GI, and begin Prehabilitation.  She will return in 3 weeks.  labs 2024  CEA &  WNL, AlkPhos slightly elevated at 211  EGD & colonoscopy 2024 (Dr. Yeni Zamorano) - Z-line regular, 36 cm from incisors - GE flap valve classified as Hill Grade II (fold present, opens w/ respiration) - gastric mucosal atrophy (bx benign, negative for H pylori & negative for metaplasia)  - 2 sessile polyps in sigmoid colon & cecum, removed (tubular adenoma & hyperplastic polyp, respectively) - submucosal non-obstructing mass found at appendiceal orifice, causing a bulging appendix, non-circumferential  * biopsy shows mildly hyperplastic colonic mucosa, no evidence of mucinous lesion/neoplasm   PET/CT 2024 - nonavid distended appendix, unchanged since 2024 - note that PET is relatively insensitive for mucinous neoplasms - nonavid multiloculated B/L adnexal cystic lesions, unchanged since 2024 - enlarged thyroid gland w/ multiple FDG avid nodules -> f/u U/S to evaluate for signs of malignancy, also TSH level - mildly FDG avid R lung subpleural opacities, likely inflammatory in nature, reassess on F/U - multiple thoracic & lumbar compression fractures.   - indeterminate FDG uptake in soft tissues in the left inguinal region, etiology unclear, query prior inguinal hernia repair   2024 - Fatmata and her daughters return for a follow-up visit to discuss recent imaging and next steps. She also saw Dr. Bridget Medrano from GynOnc yesterday .  Fatmata has seen Orthopedic Spine in the past for the compression fractures. We discussed the planned for a combined robotic possible open appendectomy, and BSO with Dr. Medrano (Gyn Onc).  We discussed the risks, benefits and alternatives of the procedure.  We also discussed post operative expectations and possible complications.  Fatmata and her daughters express understanding and agree to proceed.   She will also get a thyroid US to further evaluate the thyroid nodules.  She will also get a repeat CT chest in 4 months.  thyroid U/S 2024 - R mid pole, 6 mm hypoechoic nodule, nodule vs parathyroid adenoma (TI-RADS 3) - L mid pole isoechoic nodule, 3 cm, corresponding to FDG nodule on PET/CT (TI-RADS 3) - heterogenous predominantly isoechoic nodule in isthmus, 1.3 cm (TI-RADS 3)  **SURGERY** Fatmata is s/p a robotic assisted appendectomy & BSO (w/ Dr. Medrano) on 2024, path: - appendix path pending - BSO path shows serous cystadenomas & unremarkable fallopian tubes   2024 - Fatmata returns for an initial post-op visit, accompanied by her daughter, Kimberly. Final path on appendix is still pending. She is feeling well overall, has not restarted ECT yet. She is feeling overall, eating and ambulating with no issues and having regular bowel movements.

## 2024-06-13 NOTE — END OF VISIT
[Time Spent: ___ minutes] : I have spent [unfilled] minutes of time on the encounter. Patient Needs Assistance to Leave Residence...

## 2024-06-14 NOTE — ED ADULT NURSE NOTE - CAS DISCH CONDITION
"      ASTSGOT 20 15   ALTSGPT 6 14   TBILIRUBIN 1.3 0.9   ALKPHOSPHAT 119* 109*   GLOBULIN 2.8 2.6   INR 1.19*  --      Recent Labs     06/13/24  1357   APTT 29.4   INR 1.19*       IMAGING:  DX-CHEST-PORTABLE (1 VIEW)   Final Result         1.  No acute cardiopulmonary disease.      CT-HEAD W/O   Final Result         1.  Left holohemispheric subdural hematoma, with acute and chronic appearing component. Appears similar in size compared to prior study.   2.  Right frontal subdural hematoma, stable since prior study.   3.  Subacute or chronic appearing left frontal subdural hematoma.   4.  Partial effacement of the posterior left ventricle, stable since prior study         OUTSIDE IMAGES-CT HIP   Final Result      OUTSIDE IMAGES-DX HIP   Final Result      CT-TSPINE W/O PLUS RECONS   Final Result      Age-indeterminate mild T11 and T12 superior endplate compression and Schmorl's node deformities, possibly subacute to chronic however correlate with point tenderness.      Old T1 spinous process fracture.      CT-LSPINE W/O PLUS RECONS   Final Result      Age-indeterminate mild T12 superior endplate compression fracture deformity.      Age-indeterminate sacral fractures.      Postsurgical and degenerative changes of the lumbar spine.      CT-CHEST,ABDOMEN,PELVIS WITH   Final Result      1.  No evidence of thoracic, abdominal or pelvic organ injury.      2.  T11 and T12 superior endplate compression fractures which are possibly acute in nature.      3.  Bilateral, right greater than left sacral alar fractures with some sclerosis present as well as fracture through the S2 sacral vertebra. This may represent acute versus subacute fractures in those areas. There is      4.  Left superior and inferior pubic rami parasymphyseal fractures.      5.  Chronic right lower anterior rib fracture.      6.  Fatty change of the liver.      CT-CSPINE WITHOUT PLUS RECONS   Final Result      1.   Magana.     Improved

## 2024-06-20 ENCOUNTER — APPOINTMENT (OUTPATIENT)
Dept: SURGICAL ONCOLOGY | Facility: CLINIC | Age: 79
End: 2024-06-20
Payer: MEDICARE

## 2024-06-20 VITALS — BODY MASS INDEX: 25.46 KG/M2 | WEIGHT: 110 LBS | OXYGEN SATURATION: 98 % | HEIGHT: 55 IN | HEART RATE: 103 BPM

## 2024-06-20 DIAGNOSIS — K38.8 OTHER SPECIFIED DISEASES OF APPENDIX: ICD-10-CM

## 2024-06-20 PROCEDURE — 99024 POSTOP FOLLOW-UP VISIT: CPT

## 2024-06-20 NOTE — CONSULT LETTER
[Dear  ___] : Dear  [unfilled], [Consult Letter:] : I had the pleasure of evaluating your patient, [unfilled]. [Please see my note below.] : Please see my note below. [Consult Closing:] : Thank you very much for allowing me to participate in the care of this patient.  If you have any questions, please do not hesitate to contact me. [Sincerely,] : Sincerely, [DrJaylene  ___] : Dr. ALMANZA [DrJaylene ___] : Dr. ALMANZA [FreeTextEntry2] : Ana Mariano MD [FreeTextEntry3] : Masood Lopez MD Surgical Oncology [facility] Office: [phone] Cell: [phone]

## 2024-06-20 NOTE — ASSESSMENT
[FreeTextEntry1] : We discussed the pathology report noting acellular mucin involving appendiceal specimen.  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  We discussed options including heated intraperitoneal chemotherapy (HIPEC) and interval observation.  Fatmata and her daughter favor close observation given her age and functional status.  We will arrange for repeat CT scan in the next few weeks.  She will follow-up with us in 2 months.  All medical entries were at my, Dr. Masood Lopez, direction. I have reviewed the chart and agree that the record accurately reflects my personal performance of the history, physical exam, assessment, and plan.  Our office nurse practitioner was present for the duration of the office visit.

## 2024-06-20 NOTE — REASON FOR VISIT
[Follow-Up Visit] : a follow-up visit for [Post-Op] : a post-op for [FreeTextEntry2] : appendiceal mcuocele

## 2024-06-20 NOTE — PHYSICAL EXAM
[Normal] : supple, no neck mass and thyroid not enlarged [Normal Neck Lymph Nodes] : normal neck lymph nodes  [Normal Supraclavicular Lymph Nodes] : normal supraclavicular lymph nodes [Normal Groin Lymph Nodes] : normal groin lymph nodes [Normal Axillary Lymph Nodes] : normal axillary lymph nodes [Normal] : oriented to person, place and time, with appropriate affect [de-identified] : trochar sites healing well

## 2024-06-20 NOTE — HISTORY OF PRESENT ILLNESS
[de-identified] : Ms. FATMATA GRANT is a 78-year-old woman, referred by Dr. Ana Mariano for consultation regarding a pelvic mass, here for a post-op visit. Fatmata is primarily Telugu speaking, her 3 daughters, Renate Harris & Joselyn, assist with translation.   Fatmata reports diffuse and severe LUQ pain that started in early 2024, eventually prompting an ED visit to [facility] in April.   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 Menopause 50's. . Age of first full-term pregnancy 24. No OCP/HRT use. last colonoscopy >10 years prior and WNL.  ECOG 1-2  2024 - Fatmata is here for an initial consultation, accompanied by her 3 daughters, Kimberly Dewitt & Joselyn. Fatmata's memory waxes and wanes at times, mostly from the ECT side effects but this also started before her treatments. She reports diffuse/severe LUQ/Left flank abdominal pain that started in early March and eventually prompted a visit to the ED. The pain is also prominent in her pelvic region. Her appetite has been fairly maintained, and she has had ~5 lbs of unintentional weight loss since last month. She denies any nausea, vomiting, diarrhea or vaginal bleeding/cramping. The pain is fairly well managed with Advil.  We discussed the need for further evaluation of the appendiceal and adnexal masses.  Fatmata will undergo bloodwork (including CEA, and Ca125) today.  She will also get a PET CT.  She will see Gyn Oncology, and GI, and begin Prehabilitation.  She will return in 3 weeks.  labs 2024  CEA &  WNL, AlkPhos slightly elevated at 211  EGD & colonoscopy 2024 (Dr. Yeni Zamorano) - Z-line regular, 36 cm from incisors - GE flap valve classified as Hill Grade II (fold present, opens w/ respiration) - gastric mucosal atrophy (bx benign, negative for H pylori & negative for metaplasia)  - 2 sessile polyps in sigmoid colon & cecum, removed (tubular adenoma & hyperplastic polyp, respectively) - submucosal non-obstructing mass found at appendiceal orifice, causing a bulging appendix, non-circumferential  * biopsy shows mildly hyperplastic colonic mucosa, no evidence of mucinous lesion/neoplasm   PET/CT 2024 - nonavid distended appendix, unchanged since 2024 - note that PET is relatively insensitive for mucinous neoplasms - nonavid multiloculated B/L adnexal cystic lesions, unchanged since 2024 - enlarged thyroid gland w/ multiple FDG avid nodules -> f/u U/S to evaluate for signs of malignancy, also TSH level - mildly FDG avid R lung subpleural opacities, likely inflammatory in nature, reassess on F/U - multiple thoracic & lumbar compression fractures.   - indeterminate FDG uptake in soft tissues in the left inguinal region, etiology unclear, query prior inguinal hernia repair   2024 - Fatmata and her daughters return for a follow-up visit to discuss recent imaging and next steps. She also saw Dr. Bridget Medrano from Gyn[facility]c yesterday .  Fatmata has seen Orthopedic Spine in the past for the compression fractures. We discussed the planned for a combined robotic possible open appendectomy, and BSO with Dr. Medrano (Gyn Onc).  We discussed the risks, benefits and alternatives of the procedure.  We also discussed post operative expectations and possible complications.  Fatmata and her daughters express understanding and agree to proceed.   She will also get a thyroid US to further evaluate the thyroid nodules.  She will also get a repeat CT chest in 4 months.  thyroid U/S 2024 - R mid pole, 6 mm hypoechoic nodule, nodule vs parathyroid adenoma (TI-RADS 3) - L mid pole isoechoic nodule, 3 cm, corresponding to FDG nodule on PET/CT (TI-RADS 3) - heterogenous predominantly isoechoic nodule in isthmus, 1.3 cm (TI-RADS 3)  **SURGERY** Fatmata is s/p a robotic assisted appendectomy & BSO (w/ Dr. Medrano) on 2024, path: - appendix w/ distended lumen, mucin accumulation & calcification - acellular mucin present on the appendiceal stump/proximal margin * overall no definite evidence of LAMN however given the features of acellular mucin accumulation & epithelial atrophy, this possibility cannot be completely excluded  - BSO path shows serous cystadenomas & unremarkable fallopian tubes   2024 - Fatmata returns for an initial post-op visit, accompanied by her daughter, Kimberly. Final path on appendix is still pending. She is feeling well overall, has not restarted ECT yet. She is feeling overall, eating and ambulating with no issues and having regular bowel movements. Fatmata will follow-up in 2 weeks. From a surgical standpoint she is clear to restart ECT. We will review her appendiceal pathology once it is available.  We discussed the potential next steps including close observation, and the returning to the OR for HIPEC.  2024 - Fatmata returns for post-op care, accompanied by her daughter, Joselyn, who assists with translation. aFtmata continues to recover well, she has not restarted ECT, her daughters want her to fully recover from surgery before restarting. She is otherwise eating well and going to the bathroom with no issues.

## 2024-07-16 ENCOUNTER — APPOINTMENT (OUTPATIENT)
Dept: ULTRASOUND IMAGING | Facility: IMAGING CENTER | Age: 79
End: 2024-07-16

## 2024-09-12 NOTE — ECT OUTPATIENT PROGRAM DISCHARGE SUMMARY - NSECTTREATMENTSUMMARY_PSY_ALL_CORE
At consultation with Dr. Villatoro on 2/5/24 it was determined that a course of ECT was indicated for recurrent severe depression. She underwent an acute RUL course and successfully spaced to q3 week maintenance ECT before withdrawing from this episode of care. At her first visit, her [facility] Cognition score was 4/10 and at her last visit, her [facility] Cognition score was 8/10. She had no subjective c/o cognitive issues. She was instructed to obtain surgical clearance prior to returning for ECT, underwent robot-assisted bilateral salpingo-oophorectomy (BSO) on 5/24 and did not return for ECT. Discharged in stable condition.

## 2024-10-05 ENCOUNTER — EMERGENCY (EMERGENCY)
Facility: HOSPITAL | Age: 79
LOS: 0 days | Discharge: HOME | End: 2024-10-05
Attending: STUDENT IN AN ORGANIZED HEALTH CARE EDUCATION/TRAINING PROGRAM
Payer: MEDICARE

## 2024-10-05 VITALS
SYSTOLIC BLOOD PRESSURE: 156 MMHG | RESPIRATION RATE: 18 BRPM | DIASTOLIC BLOOD PRESSURE: 82 MMHG | OXYGEN SATURATION: 100 % | TEMPERATURE: 98 F | HEART RATE: 72 BPM

## 2024-10-05 VITALS
RESPIRATION RATE: 18 BRPM | TEMPERATURE: 98 F | OXYGEN SATURATION: 95 % | SYSTOLIC BLOOD PRESSURE: 183 MMHG | DIASTOLIC BLOOD PRESSURE: 100 MMHG | HEART RATE: 101 BPM | WEIGHT: 108.47 LBS

## 2024-10-05 DIAGNOSIS — I10 ESSENTIAL (PRIMARY) HYPERTENSION: ICD-10-CM

## 2024-10-05 DIAGNOSIS — F03.90 UNSPECIFIED DEMENTIA, UNSPECIFIED SEVERITY, WITHOUT BEHAVIORAL DISTURBANCE, PSYCHOTIC DISTURBANCE, MOOD DISTURBANCE, AND ANXIETY: ICD-10-CM

## 2024-10-05 DIAGNOSIS — R51.9 HEADACHE, UNSPECIFIED: ICD-10-CM

## 2024-10-05 DIAGNOSIS — Z98.890 OTHER SPECIFIED POSTPROCEDURAL STATES: Chronic | ICD-10-CM

## 2024-10-05 LAB
ALBUMIN SERPL ELPH-MCNC: 3.9 G/DL — SIGNIFICANT CHANGE UP (ref 3.3–5)
ALP SERPL-CCNC: 111 U/L — SIGNIFICANT CHANGE UP (ref 40–120)
ALT FLD-CCNC: 27 U/L — SIGNIFICANT CHANGE UP (ref 12–78)
ANION GAP SERPL CALC-SCNC: 9 MMOL/L — SIGNIFICANT CHANGE UP (ref 5–17)
AST SERPL-CCNC: 25 U/L — SIGNIFICANT CHANGE UP (ref 15–37)
BASOPHILS # BLD AUTO: 0.02 K/UL — SIGNIFICANT CHANGE UP (ref 0–0.2)
BASOPHILS NFR BLD AUTO: 0.3 % — SIGNIFICANT CHANGE UP (ref 0–2)
BILIRUB SERPL-MCNC: 0.4 MG/DL — SIGNIFICANT CHANGE UP (ref 0.2–1.2)
BUN SERPL-MCNC: 19 MG/DL — SIGNIFICANT CHANGE UP (ref 7–23)
CALCIUM SERPL-MCNC: 9.2 MG/DL — SIGNIFICANT CHANGE UP (ref 8.5–10.1)
CHLORIDE SERPL-SCNC: 96 MMOL/L — SIGNIFICANT CHANGE UP (ref 96–108)
CO2 SERPL-SCNC: 22 MMOL/L — SIGNIFICANT CHANGE UP (ref 22–31)
CREAT SERPL-MCNC: 0.66 MG/DL — SIGNIFICANT CHANGE UP (ref 0.5–1.3)
EGFR: 89 ML/MIN/1.73M2 — SIGNIFICANT CHANGE UP
EGFR: 89 ML/MIN/1.73M2 — SIGNIFICANT CHANGE UP
EOSINOPHIL # BLD AUTO: 0.05 K/UL — SIGNIFICANT CHANGE UP (ref 0–0.5)
EOSINOPHIL NFR BLD AUTO: 0.7 % — SIGNIFICANT CHANGE UP (ref 0–6)
GLUCOSE SERPL-MCNC: 111 MG/DL — HIGH (ref 70–99)
HCT VFR BLD CALC: 37.8 % — SIGNIFICANT CHANGE UP (ref 34.5–45)
HGB BLD-MCNC: 13.1 G/DL — SIGNIFICANT CHANGE UP (ref 11.5–15.5)
IMM GRANULOCYTES NFR BLD AUTO: 0.4 % — SIGNIFICANT CHANGE UP (ref 0–0.9)
LYMPHOCYTES # BLD AUTO: 1.11 K/UL — SIGNIFICANT CHANGE UP (ref 1–3.3)
LYMPHOCYTES # BLD AUTO: 16.1 % — SIGNIFICANT CHANGE UP (ref 13–44)
MCHC RBC-ENTMCNC: 29.1 PG — SIGNIFICANT CHANGE UP (ref 27–34)
MCHC RBC-ENTMCNC: 34.7 G/DL — SIGNIFICANT CHANGE UP (ref 32–36)
MCV RBC AUTO: 84 FL — SIGNIFICANT CHANGE UP (ref 80–100)
MONOCYTES # BLD AUTO: 0.32 K/UL — SIGNIFICANT CHANGE UP (ref 0–0.9)
MONOCYTES NFR BLD AUTO: 4.6 % — SIGNIFICANT CHANGE UP (ref 2–14)
NEUTROPHILS # BLD AUTO: 5.38 K/UL — SIGNIFICANT CHANGE UP (ref 1.8–7.4)
NEUTROPHILS NFR BLD AUTO: 77.9 % — HIGH (ref 43–77)
NRBC # BLD: 0 /100 WBCS — SIGNIFICANT CHANGE UP (ref 0–0)
NRBC BLD-RTO: 0 /100 WBCS — SIGNIFICANT CHANGE UP (ref 0–0)
PLATELET # BLD AUTO: 260 K/UL — SIGNIFICANT CHANGE UP (ref 150–400)
POTASSIUM SERPL-MCNC: 4 MMOL/L — SIGNIFICANT CHANGE UP (ref 3.5–5.3)
POTASSIUM SERPL-SCNC: 4 MMOL/L — SIGNIFICANT CHANGE UP (ref 3.5–5.3)
PROT SERPL-MCNC: 7.8 GM/DL — SIGNIFICANT CHANGE UP (ref 6–8.3)
RBC # BLD: 4.5 M/UL — SIGNIFICANT CHANGE UP (ref 3.8–5.2)
RBC # FLD: 15.1 % — HIGH (ref 10.3–14.5)
SODIUM SERPL-SCNC: 127 MMOL/L — LOW (ref 135–145)
TROPONIN I, HIGH SENSITIVITY RESULT: 32.8 NG/L — SIGNIFICANT CHANGE UP
WBC # BLD: 6.91 K/UL — SIGNIFICANT CHANGE UP (ref 3.8–10.5)
WBC # FLD AUTO: 6.91 K/UL — SIGNIFICANT CHANGE UP (ref 3.8–10.5)

## 2024-10-05 PROCEDURE — 71045 X-RAY EXAM CHEST 1 VIEW: CPT | Mod: 26

## 2024-10-05 PROCEDURE — 99285 EMERGENCY DEPT VISIT HI MDM: CPT

## 2024-10-05 PROCEDURE — 93010 ELECTROCARDIOGRAM REPORT: CPT

## 2024-10-05 PROCEDURE — 70450 CT HEAD/BRAIN W/O DYE: CPT | Mod: 26,MC

## 2024-10-05 RX ORDER — ACETAMINOPHEN 500 MG/5ML
1000 LIQUID (ML) ORAL ONCE
Refills: 0 | Status: COMPLETED | OUTPATIENT
Start: 2024-10-05 | End: 2024-10-05

## 2024-10-05 RX ORDER — ACETAMINOPHEN 500 MG/5ML
750 LIQUID (ML) ORAL ONCE
Refills: 0 | Status: DISCONTINUED | OUTPATIENT
Start: 2024-10-05 | End: 2024-10-05

## 2024-10-05 RX ORDER — METOCLOPRAMIDE HCL 10 MG
10 TABLET ORAL ONCE
Refills: 0 | Status: COMPLETED | OUTPATIENT
Start: 2024-10-05 | End: 2024-10-05

## 2024-10-05 RX ORDER — LABETALOL HYDROCHLORIDE 200 MG/1
10 TABLET, FILM COATED ORAL ONCE
Refills: 0 | Status: COMPLETED | OUTPATIENT
Start: 2024-10-05 | End: 2024-10-05

## 2024-10-05 RX ORDER — KETOROLAC TROMETHAMINE 30 MG/ML
15 INJECTION, SOLUTION INTRAMUSCULAR; INTRAVENOUS ONCE
Refills: 0 | Status: DISCONTINUED | OUTPATIENT
Start: 2024-10-05 | End: 2024-10-05

## 2024-10-05 RX ADMIN — Medication 10 MILLIGRAM(S): at 17:40

## 2024-10-05 RX ADMIN — KETOROLAC TROMETHAMINE 15 MILLIGRAM(S): 30 INJECTION, SOLUTION INTRAMUSCULAR; INTRAVENOUS at 15:29

## 2024-10-05 RX ADMIN — Medication 10 MILLIGRAM(S): at 13:43

## 2024-10-05 RX ADMIN — Medication 400 MILLIGRAM(S): at 13:43

## 2024-10-05 RX ADMIN — LABETALOL HYDROCHLORIDE 10 MILLIGRAM(S): 200 TABLET, FILM COATED ORAL at 16:01

## 2024-10-05 RX ADMIN — Medication 1000 MILLILITER(S): at 14:40

## (undated) DEVICE — SOL IRR POUR H2O 250ML

## (undated) DEVICE — DRSG CURITY GAUZE SPONGE 4 X 4" 12-PLY NON-STERILE

## (undated) DEVICE — BIOPSY FORCEP COLD DISP

## (undated) DEVICE — XI VESSEL SEALER

## (undated) DEVICE — PACK IV START WITH CHG

## (undated) DEVICE — TUBING INSUFFLATION LAP FILTER 10FT

## (undated) DEVICE — XI ARM FORCEP MARYLAND BIPOLAR

## (undated) DEVICE — GLV 7 PROTEXIS (WHITE)

## (undated) DEVICE — TUBING SUCTION NONCONDUCTIVE 6MM X 12FT

## (undated) DEVICE — BIOPSY FORCEP RADIAL JAW 4 STANDARD WITH NEEDLE

## (undated) DEVICE — GLV 6.5 PROTEXIS (WHITE)

## (undated) DEVICE — SUT POLYSORB 0 60" TIES UNDYED

## (undated) DEVICE — POLY TRAP ETRAP

## (undated) DEVICE — NDL HYPO REGULAR BEVEL 25G X 1.5" (BLUE)

## (undated) DEVICE — DRAPE LIGHT HANDLE COVER (BLUE)

## (undated) DEVICE — FOLEY TRAY 16FR 5CC LF UMETER CLOSED

## (undated) DEVICE — XI ARM GRASPER TIP UP FENESTRATED

## (undated) DEVICE — ENDOCATCH II 15MM

## (undated) DEVICE — BLADE SCALPEL SAFETYLOCK #10

## (undated) DEVICE — TROCAR SURGIQUEST AIRSEAL 5MM X 100MM

## (undated) DEVICE — SUT BIOSYN 4-0 18" P-12

## (undated) DEVICE — POSITIONER STRAP ARMBOARD VELCRO TS-30

## (undated) DEVICE — XI ARM FORCEP TENACULUM

## (undated) DEVICE — XI ARM NEEDLE DRIVER SUTURECUT MEGA 8MM

## (undated) DEVICE — ENDOCATCH 10MM SPECIMEN POUCH

## (undated) DEVICE — XI ARM SCISSOR MONO CURVED

## (undated) DEVICE — POSITIONER PINK PAD PIGAZZI SYSTEM

## (undated) DEVICE — XI CORD MONOPOLAR CAUTERY (GREEN)

## (undated) DEVICE — XI CORD BIPOLAR CAUTERY (BLUE)

## (undated) DEVICE — VENODYNE/SCD SLEEVE CALF MEDIUM

## (undated) DEVICE — SUT VICRYL 0 27" UR-6

## (undated) DEVICE — TUBING MEDI-VAC W MAXIGRIP CONNECTORS 1/4"X6'

## (undated) DEVICE — POSITIONER FOAM EGG CRATE ULNAR 2PCS (PINK)

## (undated) DEVICE — DRAPE MAYO STAND 30"

## (undated) DEVICE — D HELP - CLEARVIEW CLEARIFY SYSTEM

## (undated) DEVICE — XI ARM PERMANENT CAUTERY HOOK

## (undated) DEVICE — SUT PROLENE 4-0 36" SH

## (undated) DEVICE — XI ARM FORCEP FENESTRATED BIPOLAR 8MM

## (undated) DEVICE — HANDSET ARGON

## (undated) DEVICE — NDL COUNTER FOAM AND MAGNET 40-70

## (undated) DEVICE — CLAMP BX HOT RAD JAW 3

## (undated) DEVICE — PACK ROBOTIC LIJ

## (undated) DEVICE — XI ARM FORCEP PROGRASP 8MM

## (undated) DEVICE — BASIN EMESIS 10IN GRADUATED MAUVE

## (undated) DEVICE — XI ARM PERMANENT CAUTERY SPATULA

## (undated) DEVICE — XI SHEARS HARMONIC CVD 8MM

## (undated) DEVICE — XI SEAL UNIVERSIAL 5-12MM

## (undated) DEVICE — XI ARM DISSECTOR CURVED BIPOLAR 8MM

## (undated) DEVICE — DRAPE BACK TABLE COVER HEAVY DUTY 60"

## (undated) DEVICE — ELCTR BOVIE PENCIL SMOKE EVACUATION

## (undated) DEVICE — SUT MONOCRYL 4-0 27" PS-2 UNDYED

## (undated) DEVICE — DENTURE CUP PINK

## (undated) DEVICE — GOWN XL

## (undated) DEVICE — TUBING AIRSEAL TRI-LUMEN FILTERED

## (undated) DEVICE — CATH IV SAFE BC 22G X 1" (BLUE)

## (undated) DEVICE — WARMING BLANKET LOWER ADULT

## (undated) DEVICE — SI NEEDLE DRIVER SUTURECUT MEGA

## (undated) DEVICE — BLADE SURGICAL #10 CARBON

## (undated) DEVICE — XI DRAPE COLUMN

## (undated) DEVICE — ELCTR ECG CONDUCTIVE ADHESIVE

## (undated) DEVICE — SOL IRR POUR NS 0.9% 500ML

## (undated) DEVICE — UTERINE MANIPULATOR CONMED VCARE LG 37MM

## (undated) DEVICE — UTERINE MANIPULATOR CONMED VCARE SM 32MM

## (undated) DEVICE — DRSG BANDAID 0.75X3"

## (undated) DEVICE — WARMING BLANKET UPPER ADULT

## (undated) DEVICE — UNDERPAD LINEN SAVER 17 X 24"

## (undated) DEVICE — TUBING IV SET GRAVITY 3Y 100" MACRO

## (undated) DEVICE — LUBRICATING JELLY ONESHOT 1.25OZ

## (undated) DEVICE — GLV 7.5 PROTEXIS (WHITE)

## (undated) DEVICE — TUBING STRYKEFLOW II SUCTION / IRRIGATOR

## (undated) DEVICE — DRAPE IOBAN 23" X 23"

## (undated) DEVICE — DRSG STERISTRIPS 0.5 X 4"

## (undated) DEVICE — ELCTR GROUNDING PAD ADULT COVIDIEN

## (undated) DEVICE — XI ARM NEEDLE DRIVER LARGE

## (undated) DEVICE — MEDICATION LABELS W MARKER

## (undated) DEVICE — UTERINE MANIPULATOR CONMED VCARE MED 34MM

## (undated) DEVICE — GLV 7 PROTEXIS (BLUE)

## (undated) DEVICE — MARKING PEN W RULER

## (undated) DEVICE — XI ARM CLIP APPLIER LARGE

## (undated) DEVICE — CONTAINER FORMALIN 80ML YELLOW

## (undated) DEVICE — SUT MAXON 1 96" T-60

## (undated) DEVICE — PACK PERI GYN

## (undated) DEVICE — DRSG OPSITE 13.75 X 4"

## (undated) DEVICE — XI OBTURATOR OPTICAL BLADELESS 8MM

## (undated) DEVICE — XI ARM CLIP APPLIER MEDIUM-LARGE

## (undated) DEVICE — PREP CHLORAPREP HI-LITE ORANGE 26ML

## (undated) DEVICE — POSITIONER PURPLE ARM ONE STEP (LARGE)

## (undated) DEVICE — SNARE EXACTO COLD 9MMX230CM

## (undated) DEVICE — PACK D&C

## (undated) DEVICE — UTERINE MANIPULATOR COOPER SURGICAL 5MM 33CM GREEN

## (undated) DEVICE — SYR LUER LOK 3CC

## (undated) DEVICE — PREP BETADINE KIT

## (undated) DEVICE — XI DRAPE ARM

## (undated) DEVICE — GOWN LG

## (undated) DEVICE — SALIVA EJECTOR (BLUE)

## (undated) DEVICE — XI TIP COVER

## (undated) DEVICE — GLV 8.5 PROTEXIS (WHITE)

## (undated) DEVICE — WARMING BLANKET FULL ADULT

## (undated) DEVICE — TUBING SUCTION 20FT

## (undated) DEVICE — SPECIMEN CONTAINER 100ML

## (undated) DEVICE — SCOPE WARMER SEAL DISP

## (undated) DEVICE — XI STAPLER SUREFORM 45

## (undated) DEVICE — SUT VICRYL 0 27" CT-2 UNDYED

## (undated) DEVICE — BITE BLOCK ADULT 20 X 27MM (GREEN)

## (undated) DEVICE — DRAPE INSTRUMENT POUCH 6.75" X 11"

## (undated) DEVICE — DRSG 2X2

## (undated) DEVICE — GLV 8 PROTEXIS (WHITE)

## (undated) DEVICE — VALVE YELLOW PORT SEAL PLUS 5MM

## (undated) DEVICE — POSITIONER FOAM HEAD CRADLE (PINK)

## (undated) DEVICE — LUBRICATING JELLY HR ONE SHOT 3G